# Patient Record
Sex: FEMALE | Race: WHITE | NOT HISPANIC OR LATINO | Employment: UNEMPLOYED | URBAN - METROPOLITAN AREA
[De-identification: names, ages, dates, MRNs, and addresses within clinical notes are randomized per-mention and may not be internally consistent; named-entity substitution may affect disease eponyms.]

---

## 2020-01-28 ENCOUNTER — HOSPITAL ENCOUNTER (OUTPATIENT)
Dept: FAMILY MEDICINE CLINIC | Facility: CLINIC | Age: 60
Discharge: HOME | End: 2020-01-28
Attending: NURSE PRACTITIONER

## 2020-01-28 ENCOUNTER — OFFICE VISIT CONVERTED (OUTPATIENT)
Dept: FAMILY MEDICINE CLINIC | Facility: CLINIC | Age: 60
End: 2020-01-28
Attending: NURSE PRACTITIONER

## 2020-01-28 ENCOUNTER — CONVERSION ENCOUNTER (OUTPATIENT)
Dept: FAMILY MEDICINE CLINIC | Facility: CLINIC | Age: 60
End: 2020-01-28

## 2021-05-09 VITALS
SYSTOLIC BLOOD PRESSURE: 170 MMHG | HEIGHT: 65 IN | BODY MASS INDEX: 29.34 KG/M2 | TEMPERATURE: 98.2 F | WEIGHT: 176.12 LBS | HEART RATE: 82 BPM | DIASTOLIC BLOOD PRESSURE: 96 MMHG | OXYGEN SATURATION: 97 %

## 2022-05-03 ENCOUNTER — OFFICE VISIT (OUTPATIENT)
Dept: FAMILY MEDICINE CLINIC | Facility: CLINIC | Age: 62
End: 2022-05-03

## 2022-05-03 VITALS
OXYGEN SATURATION: 98 % | TEMPERATURE: 98.2 F | WEIGHT: 166 LBS | SYSTOLIC BLOOD PRESSURE: 162 MMHG | HEIGHT: 65 IN | DIASTOLIC BLOOD PRESSURE: 98 MMHG | BODY MASS INDEX: 27.66 KG/M2 | HEART RATE: 85 BPM

## 2022-05-03 DIAGNOSIS — Z12.11 COLON CANCER SCREENING: ICD-10-CM

## 2022-05-03 DIAGNOSIS — M25.562 LEFT KNEE PAIN, UNSPECIFIED CHRONICITY: Primary | ICD-10-CM

## 2022-05-03 PROCEDURE — 99213 OFFICE O/P EST LOW 20 MIN: CPT | Performed by: NURSE PRACTITIONER

## 2022-05-03 RX ORDER — DICLOFENAC SODIUM 75 MG/1
75 TABLET, DELAYED RELEASE ORAL 2 TIMES DAILY
Qty: 60 TABLET | Refills: 1 | Status: SHIPPED | OUTPATIENT
Start: 2022-05-03 | End: 2022-08-31 | Stop reason: SDUPTHER

## 2022-05-03 NOTE — PROGRESS NOTES
Chief Complaint  Knee Pain (Left knee pain and swelling, started sometime in February)    PHQ-2 Total Score: 0    Subjective        Past Medical History:   Diagnosis Date   • Osteopenia      Social History     Tobacco Use   • Smoking status: Current Every Day Smoker     Packs/day: 1.00     Start date: 1973   • Smokeless tobacco: Never Used   Vaping Use   • Vaping Use: Never used   Substance Use Topics   • Alcohol use: Yes   • Drug use: Yes     Types: Marijuana      Current Outpatient Medications on File Prior to Visit   Medication Sig   • B Complex Vitamins (VITAMIN B COMPLEX PO) Take  by mouth.   • Calcium-Magnesium-Vitamin D 185- MG-MG-UNIT capsule Take  by mouth.   • Misc Natural Products (OSTEO BI-FLEX ADV JOINT SHIELD PO) Take  by mouth.   • Potassium (POTASSIMIN PO) Take  by mouth.     No current facility-administered medications on file prior to visit.      Allergies   Allergen Reactions   • Hydrocodone Headache   • Hydrocodone-Acetaminophen Headache      Health Maintenance Due   Topic Date Due   • DXA SCAN  Never done   • ANNUAL PHYSICAL  Never done   • Pneumococcal Vaccine 0-64 (1 - PCV) Never done   • TDAP/TD VACCINES (1 - Tdap) Never done   • ZOSTER VACCINE (1 of 2) Never done   • COVID-19 Vaccine (3 - Booster for Moderna series) 10/11/2021   • HEPATITIS C SCREENING  Never done   • PAP SMEAR  Never done      Maine Taylor presents to [facility]  Here with left knee pain and swelling x 2-3 months. Pt states the pain worsens with weather changes. No known injury or change in activity. Pt states she woke up one morning and the knee felt like something had been trying to push the knee sideways. She has taken tylenol, ibuprofen or aleve with mild relief. Pain is worse with going from sitting to standing and going down the steps.     Elevated BP in office today. Monitors BP at home and states it is usually number.     Had a mammogram in December 2021. Pap smear is up  "to date.     Knee Pain         Objective   Vital Signs:   /98 (BP Location: Left arm)   Pulse 85   Temp 98.2 °F (36.8 °C)   Ht 165.1 cm (65\")   Wt 75.3 kg (166 lb)   SpO2 98%   BMI 27.62 kg/m²     Review of Systems   Physical Exam  Vitals reviewed.   Constitutional:       General: She is not in acute distress.     Appearance: Normal appearance. She is well-developed.   Eyes:      Conjunctiva/sclera: Conjunctivae normal.      Pupils: Pupils are equal, round, and reactive to light.   Cardiovascular:      Rate and Rhythm: Normal rate and regular rhythm.      Heart sounds: Normal heart sounds.   Pulmonary:      Effort: Pulmonary effort is normal.      Breath sounds: Normal breath sounds.   Musculoskeletal:      Left knee: Swelling present. No deformity or bony tenderness. No tenderness.      Right lower leg: Edema present.      Left lower leg: Edema present.   Skin:     General: Skin is warm and dry.   Neurological:      Mental Status: She is alert and oriented to person, place, and time.   Psychiatric:         Mood and Affect: Mood and affect normal.         Behavior: Behavior normal.         Thought Content: Thought content normal.         Judgment: Judgment normal.        Result Review :   The following data was reviewed by: LONNIE Huber on 05/03/2022:    Data reviewed: Radiologic studies Left knee x-ray          Assessment and Plan    Diagnoses and all orders for this visit:    1. Left knee pain, unspecified chronicity (Primary)  -     XR Knee 1 or 2 View Left (In Office)  -     diclofenac (VOLTAREN) 75 MG EC tablet; Take 1 tablet by mouth 2 (Two) Times a Day.  Dispense: 60 tablet; Refill: 1    2. Colon cancer screening  -     Cologuard - Stool, Per Rectum; Future       Notify in 1 week with no improvement and will order MRI.    Follow Up   Return in about 1 week (around 5/10/2022), or if symptoms worsen or fail to improve.  Patient was given instructions and counseling regarding her condition " or for health maintenance advice. Please see specific information pulled into the AVS if appropriate.

## 2022-05-25 DIAGNOSIS — R19.5 POSITIVE COLORECTAL CANCER SCREENING USING DNA-BASED STOOL TEST: Primary | ICD-10-CM

## 2022-08-22 ENCOUNTER — TELEPHONE (OUTPATIENT)
Dept: GASTROENTEROLOGY | Facility: CLINIC | Age: 62
End: 2022-08-22

## 2022-08-22 NOTE — TELEPHONE ENCOUNTER
Attempted to contact patient to do screening call and schedule colonoscopy. Left voicemail for patient to reschedule

## 2022-08-31 ENCOUNTER — OFFICE VISIT (OUTPATIENT)
Dept: FAMILY MEDICINE CLINIC | Facility: CLINIC | Age: 62
End: 2022-08-31

## 2022-08-31 VITALS
DIASTOLIC BLOOD PRESSURE: 108 MMHG | WEIGHT: 182 LBS | SYSTOLIC BLOOD PRESSURE: 164 MMHG | HEART RATE: 101 BPM | BODY MASS INDEX: 30.32 KG/M2 | TEMPERATURE: 96.9 F | OXYGEN SATURATION: 98 % | HEIGHT: 65 IN

## 2022-08-31 DIAGNOSIS — M25.562 LEFT KNEE PAIN, UNSPECIFIED CHRONICITY: ICD-10-CM

## 2022-08-31 DIAGNOSIS — M54.50 LOW BACK PAIN WITH RADIATION: Primary | ICD-10-CM

## 2022-08-31 PROBLEM — G43.909 MIGRAINE: Status: ACTIVE | Noted: 2022-08-31

## 2022-08-31 PROBLEM — D64.9 ANEMIA: Status: ACTIVE | Noted: 2022-08-31

## 2022-08-31 PROCEDURE — 99213 OFFICE O/P EST LOW 20 MIN: CPT | Performed by: NURSE PRACTITIONER

## 2022-08-31 NOTE — PROGRESS NOTES
Chief Complaint  Arthritis (Wants to discuss the diclofenac, wants to change or increase) and Back Pain    Subjective        Past Medical History:   Diagnosis Date   • Osteopenia      Social History     Tobacco Use   • Smoking status: Current Every Day Smoker     Packs/day: 1.00     Start date: 1973   • Smokeless tobacco: Never Used   Vaping Use   • Vaping Use: Never used   Substance Use Topics   • Alcohol use: Yes   • Drug use: Yes     Types: Marijuana      Current Outpatient Medications on File Prior to Visit   Medication Sig   • B Complex Vitamins (VITAMIN B COMPLEX PO) Take  by mouth.   • Calcium-Magnesium-Vitamin D 185- MG-MG-UNIT capsule Take  by mouth.   • Misc Natural Products (OSTEO BI-FLEX ADV JOINT SHIELD PO) Take  by mouth.   • Potassium (POTASSIMIN PO) Take  by mouth.   • [DISCONTINUED] diclofenac (VOLTAREN) 75 MG EC tablet Take 1 tablet by mouth 2 (Two) Times a Day.     No current facility-administered medications on file prior to visit.      Allergies   Allergen Reactions   • Hydrocodone Headache   • Hydrocodone-Acetaminophen Headache      Health Maintenance Due   Topic Date Due   • MAMMOGRAM  Never done   • DXA SCAN  Never done   • ANNUAL PHYSICAL  Never done   • Pneumococcal Vaccine 0-64 (1 - PCV) Never done   • TDAP/TD VACCINES (1 - Tdap) Never done   • ZOSTER VACCINE (1 of 2) Never done   • COVID-19 Vaccine (3 - Booster for Moderna series) 10/11/2021   • HEPATITIS C SCREENING  Never done   • PAP SMEAR  Never done      Maine Taylor presents to [facility]  Here to follow up on arthritis. She is currently taking diclofenac 75mg twice daily as needed for back pain and will improve pain in about 2 days but when her knee bothers her she will have to take for 5-6 days. Also notes low back pain that angles down into the hip and out through the left groin and all the way down to the ankle. Using a tiger balm patch will also help and aspercreme with lidocaine.  "Pt does not want to take diclofenac daily.       Objective   Vital Signs:   BP (!) 164/108 (BP Location: Right arm)   Pulse 101   Temp 96.9 °F (36.1 °C)   Ht 165.1 cm (65\")   Wt 82.6 kg (182 lb)   SpO2 98%   BMI 30.29 kg/m²     Review of Systems   Physical Exam  Vitals reviewed.   Constitutional:       General: She is not in acute distress.     Appearance: Normal appearance. She is well-developed.   HENT:      Head: Normocephalic and atraumatic.      Right Ear: External ear normal.      Left Ear: External ear normal.   Eyes:      Conjunctiva/sclera: Conjunctivae normal.      Pupils: Pupils are equal, round, and reactive to light.   Cardiovascular:      Rate and Rhythm: Normal rate and regular rhythm.      Heart sounds: Normal heart sounds.   Pulmonary:      Effort: Pulmonary effort is normal.      Breath sounds: Normal breath sounds.   Musculoskeletal:      Cervical back: Neck supple.      Left knee: Swelling and crepitus present.      Right lower leg: Edema present.      Left lower leg: Edema present.   Skin:     General: Skin is warm and dry.   Neurological:      Mental Status: She is alert and oriented to person, place, and time.      Cranial Nerves: No cranial nerve deficit.   Psychiatric:         Mood and Affect: Mood and affect normal.         Behavior: Behavior normal.         Thought Content: Thought content normal.         Judgment: Judgment normal.        Result Review :   The following data was reviewed by: LONNIE Huber on 08/31/2022:    Data reviewed: Radiologic studies Left knee x-ray          Assessment and Plan    Diagnoses and all orders for this visit:    1. Low back pain with radiation (Primary)  -     Ambulatory Referral to Physical Therapy Evaluate and treat; Stretching, ROM, Strengthening    2. Left knee pain, unspecified chronicity  -     diclofenac (VOLTAREN) 50 MG EC tablet; Take 1 tablet by mouth 3 (Three) Times a Day.  Dispense: 90 tablet; Refill: 2  -     Ambulatory " Referral to Physical Therapy Evaluate and treat; Stretching, ROM, Strengthening           Notify with no improvement in the knee and will refer to Ortho.     Follow Up   Return in about 4 weeks (around 9/28/2022) for Recheck.  Patient was given instructions and counseling regarding her condition or for health maintenance advice. Please see specific information pulled into the AVS if appropriate.

## 2022-09-14 ENCOUNTER — PREP FOR SURGERY (OUTPATIENT)
Dept: OTHER | Facility: HOSPITAL | Age: 62
End: 2022-09-14

## 2022-09-14 ENCOUNTER — CLINICAL SUPPORT (OUTPATIENT)
Dept: GASTROENTEROLOGY | Facility: CLINIC | Age: 62
End: 2022-09-14

## 2022-09-14 DIAGNOSIS — Z12.11 COLON CANCER SCREENING: Primary | ICD-10-CM

## 2022-09-14 RX ORDER — SODIUM, POTASSIUM,MAG SULFATES 17.5-3.13G
1 SOLUTION, RECONSTITUTED, ORAL ORAL EVERY 12 HOURS
Qty: 354 ML | Refills: 0 | Status: SHIPPED | OUTPATIENT
Start: 2022-09-14 | End: 2022-09-15 | Stop reason: SDUPTHER

## 2022-09-14 NOTE — PROGRESS NOTES
Maine Taylor  REASON FOR CALL Screening for colonoscopy   SENT IN [facility]   Past Medical History:   Diagnosis Date   • Osteopenia      Allergies   Allergen Reactions   • Hydrocodone Headache   • Hydrocodone-Acetaminophen Headache     Past Surgical History:   Procedure Laterality Date   • POLYPECTOMY       Social History     Socioeconomic History   • Marital status:    Tobacco Use   • Smoking status: Current Every Day Smoker     Packs/day: 1.00     Start date: 1973   • Smokeless tobacco: Never Used   Vaping Use   • Vaping Use: Never used   Substance and Sexual Activity   • Alcohol use: Yes   • Drug use: Yes     Types: Marijuana   • Sexual activity: Defer     Family History   Problem Relation Age of Onset   • Dementia Mother    • Hypertension Father    • Throat cancer Father        Current Outpatient Medications:   •  B Complex Vitamins (VITAMIN B COMPLEX PO), Take  by mouth., Disp: , Rfl:   •  Calcium-Magnesium-Vitamin D 185- MG-MG-UNIT capsule, Take  by mouth., Disp: , Rfl:   •  diclofenac (VOLTAREN) 50 MG EC tablet, Take 1 tablet by mouth 3 (Three) Times a Day., Disp: 90 tablet, Rfl: 2  •  Misc Natural Products (OSTEO BI-FLEX ADV JOINT SHIELD PO), Take  by mouth., Disp: , Rfl:   •  Potassium (POTASSIMIN PO), Take  by mouth., Disp: , Rfl:

## 2022-09-15 ENCOUNTER — TELEPHONE (OUTPATIENT)
Dept: GASTROENTEROLOGY | Facility: CLINIC | Age: 62
End: 2022-09-15

## 2023-01-19 ENCOUNTER — TELEPHONE (OUTPATIENT)
Dept: GASTROENTEROLOGY | Facility: CLINIC | Age: 63
End: 2023-01-19
Payer: COMMERCIAL

## 2023-02-03 ENCOUNTER — ANESTHESIA EVENT (OUTPATIENT)
Dept: GASTROENTEROLOGY | Facility: HOSPITAL | Age: 63
End: 2023-02-03
Payer: COMMERCIAL

## 2023-02-03 ENCOUNTER — ANESTHESIA (OUTPATIENT)
Dept: GASTROENTEROLOGY | Facility: HOSPITAL | Age: 63
End: 2023-02-03
Payer: COMMERCIAL

## 2023-02-03 ENCOUNTER — HOSPITAL ENCOUNTER (OUTPATIENT)
Facility: HOSPITAL | Age: 63
Setting detail: HOSPITAL OUTPATIENT SURGERY
Discharge: HOME | End: 2023-02-03
Attending: INTERNAL MEDICINE | Admitting: INTERNAL MEDICINE
Payer: COMMERCIAL

## 2023-02-03 VITALS
DIASTOLIC BLOOD PRESSURE: 114 MMHG | HEART RATE: 69 BPM | BODY MASS INDEX: 30.63 KG/M2 | WEIGHT: 183.86 LBS | OXYGEN SATURATION: 97 % | TEMPERATURE: 98.1 F | RESPIRATION RATE: 17 BRPM | HEIGHT: 65 IN | SYSTOLIC BLOOD PRESSURE: 156 MMHG

## 2023-02-03 DIAGNOSIS — Z12.11 COLON CANCER SCREENING: ICD-10-CM

## 2023-02-03 PROCEDURE — 45390 COLONOSCOPY W/RESECTION: CPT | Performed by: INTERNAL MEDICINE

## 2023-02-03 PROCEDURE — 25010000002 PROPOFOL 10 MG/ML EMULSION: Performed by: NURSE ANESTHETIST, CERTIFIED REGISTERED

## 2023-02-03 PROCEDURE — 88305 TISSUE EXAM BY PATHOLOGIST: CPT | Performed by: INTERNAL MEDICINE

## 2023-02-03 DEVICE — DEV CLIP HEMO RESOLUTION360/ULTR 235CM 17MM STRL: Type: IMPLANTABLE DEVICE | Site: CECUM | Status: FUNCTIONAL

## 2023-02-03 RX ORDER — LIDOCAINE HYDROCHLORIDE 20 MG/ML
INJECTION, SOLUTION EPIDURAL; INFILTRATION; INTRACAUDAL; PERINEURAL AS NEEDED
Status: DISCONTINUED | OUTPATIENT
Start: 2023-02-03 | End: 2023-02-03 | Stop reason: SURG

## 2023-02-03 RX ORDER — PROPOFOL 10 MG/ML
VIAL (ML) INTRAVENOUS AS NEEDED
Status: DISCONTINUED | OUTPATIENT
Start: 2023-02-03 | End: 2023-02-03 | Stop reason: SURG

## 2023-02-03 RX ORDER — SODIUM CHLORIDE, SODIUM LACTATE, POTASSIUM CHLORIDE, CALCIUM CHLORIDE 600; 310; 30; 20 MG/100ML; MG/100ML; MG/100ML; MG/100ML
30 INJECTION, SOLUTION INTRAVENOUS CONTINUOUS
Status: DISCONTINUED | OUTPATIENT
Start: 2023-02-03 | End: 2023-02-03 | Stop reason: HOSPADM

## 2023-02-03 RX ADMIN — PROPOFOL 50 MG: 10 INJECTION, EMULSION INTRAVENOUS at 09:33

## 2023-02-03 RX ADMIN — SODIUM CHLORIDE, POTASSIUM CHLORIDE, SODIUM LACTATE AND CALCIUM CHLORIDE: 600; 310; 30; 20 INJECTION, SOLUTION INTRAVENOUS at 09:33

## 2023-02-03 RX ADMIN — PROPOFOL 250 MCG/KG/MIN: 10 INJECTION, EMULSION INTRAVENOUS at 09:33

## 2023-02-03 RX ADMIN — LIDOCAINE HYDROCHLORIDE 40 MG: 20 INJECTION, SOLUTION EPIDURAL; INFILTRATION; INTRACAUDAL; PERINEURAL at 09:33

## 2023-02-03 NOTE — ANESTHESIA POSTPROCEDURE EVALUATION
Patient: Maine Taylor    Procedure Summary     Date: 02/03/23 Room / Location: [facility] ENDOSCOPY 1 / [facility] ENDOSCOPY    Anesthesia Start: 0932 Anesthesia Stop: 1022    Procedure: COLONOSCOPY WITH ORISE INJECTION, PIECEMEAL POLYPECTOMY, CATUTERY, CLIP APPLICATION X5 Diagnosis:       Colon cancer screening      (Colon cancer screening [Z12.11])    Surgeons: Chanell Sims MD Provider: Chucho Omer MD    Anesthesia Type: general ASA Status: 3          Anesthesia Type: general    Vitals  Vitals Value Taken Time   /114 02/03/23 1048   Temp 36.7 °C (98.1 °F) 02/03/23 1048   Pulse 69 02/03/23 1048   Resp 17 02/03/23 1048   SpO2 97 % 02/03/23 1048           Post Anesthesia Care and Evaluation    Patient location during evaluation: bedside  Patient participation: complete - patient participated  Level of consciousness: awake  Pain management: adequate    Airway patency: patent  Anesthetic complications: No anesthetic complications  PONV Status: none  Cardiovascular status: acceptable and stable  Respiratory status: acceptable  Hydration status: acceptable    Comments: An Anesthesiologist personally participated in the most demanding procedures (including induction and emergence if applicable) in the anesthesia plan, monitored the course of anesthesia administration at frequent intervals and remained physically present and available for immediate diagnosis and treatment of emergencies.

## 2023-02-03 NOTE — ANESTHESIA PREPROCEDURE EVALUATION
Anesthesia Evaluation     Patient summary reviewed and Nursing notes reviewed   no history of anesthetic complications:  NPO Solid Status: > 8 hours  NPO Liquid Status: > 2 hours           Airway   Mallampati: III  TM distance: >3 FB  Neck ROM: full  Possible difficult intubation and Small opening  Dental    (+) poor dentition    Pulmonary - normal exam    breath sounds clear to auscultation  (+) a smoker Current, sleep apnea,   Cardiovascular - negative cardio ROS and normal exam  Exercise tolerance: good (4-7 METS)    Rhythm: regular  Rate: normal        Neuro/Psych- negative ROS  GI/Hepatic/Renal/Endo - negative ROS     Musculoskeletal (-) negative ROS    Abdominal    Substance History - negative use     OB/GYN negative ob/gyn ROS         Other - negative ROS       ROS/Med Hx Other: PAT Nursing Notes unavailable.                   Anesthesia Plan    ASA 3     general       Anesthetic plan, risks, benefits, and alternatives have been provided, discussed and informed consent has been obtained with: patient and spouse/significant other.        CODE STATUS:

## 2023-02-03 NOTE — H&P
"Pre Procedure History & Physical    Chief Complaint:   Positive Cologuard    Subjective     HPI:   61 yo F here for positive Cologuard.    Past Medical History:   Past Medical History:   Diagnosis Date   • Arthritis    • Osteopenia    • Sleep apnea        Past Surgical History:  Past Surgical History:   Procedure Laterality Date   • POLYPECTOMY N/A     cervical   • TUBAL ABDOMINAL LIGATION N/A        Family History:  Family History   Problem Relation Age of Onset   • Dementia Mother    • Hypertension Father    • Throat cancer Father    • Malig Hyperthermia Neg Hx        Social History:   reports that she has been smoking cigarettes. She started smoking about 50 years ago. She has been smoking an average of 1 pack per day. She has never used smokeless tobacco. She reports current alcohol use. She reports current drug use. Drug: Marijuana.    Medications:   Medications Prior to Admission   Medication Sig Dispense Refill Last Dose   • B Complex Vitamins (VITAMIN B COMPLEX PO) Take 1 tablet by mouth Daily.      • Calcium-Magnesium-Vitamin D 185- MG-MG-UNIT capsule Take 3 tablets by mouth Daily.      • diclofenac (VOLTAREN) 50 MG EC tablet Take 1 tablet by mouth 3 (Three) Times a Day. (Patient taking differently: Take 50 mg by mouth As Needed.) 90 tablet 2    • Misc Natural Products (OSTEO BI-FLEX ADV JOINT SHIELD PO) Take 1 tablet by mouth Daily.      • polyethylene glycol (GoLYTELY) 236 g solution Starting at noon on day prior to procedure, drink 8 ounces every 30 minutes until all gone or stools are clear. May add flavor packet. (Patient taking differently: Take  by mouth Every 12 (Twelve) Hours.) 4000 mL 0    • Potassium (POTASSIMIN PO) Take 1 tablet by mouth Daily.          Allergies:  Hydrocodone and Hydrocodone-acetaminophen    ROS:    Pertinent items are noted in HPI     Objective     Height 165.1 cm (65\"), weight 83.4 kg (183 lb 13.8 oz).    Physical Exam   Constitutional: Pt is oriented to person, place, " and time and well-developed, well-nourished, and in no distress.   Mouth/Throat: Oropharynx is clear and moist.   Neck: Normal range of motion.   Cardiovascular: Normal rate, regular rhythm and normal heart sounds.    Pulmonary/Chest: Effort normal and breath sounds normal.   Abdominal: Soft. Nontender  Skin: Skin is warm and dry.   Psychiatric: Mood, memory, affect and judgment normal.     Assessment & Plan     Diagnosis:  Positive Cologuard    Anticipated Surgical Procedure:  Colonoscopy    The risks, benefits, and alternatives of this procedure have been discussed with the patient or the responsible party- the patient understands and agrees to proceed.

## 2023-02-06 ENCOUNTER — TELEPHONE (OUTPATIENT)
Dept: GASTROENTEROLOGY | Facility: CLINIC | Age: 63
End: 2023-02-06
Payer: COMMERCIAL

## 2023-02-06 LAB
CYTO UR: NORMAL
LAB AP CASE REPORT: NORMAL
LAB AP CLINICAL INFORMATION: NORMAL
PATH REPORT.FINAL DX SPEC: NORMAL
PATH REPORT.GROSS SPEC: NORMAL

## 2023-02-06 NOTE — TELEPHONE ENCOUNTER
Patient notified of results, she would like to go ahead and scheduled repeat colonoscopy because it had taken her quite a while to get on for the original colonoscopy, I told her I would ask you.

## 2023-02-06 NOTE — TELEPHONE ENCOUNTER
----- Message from LONNIE Phillips sent at 2/6/2023 12:24 PM EST -----  Please let patient know that colon polyp biopsies are benign.  Recommend repeat colonoscopy in 6 months to follow-up on piecemeal polyp removal.  Please place in recall.

## 2023-02-07 ENCOUNTER — PREP FOR SURGERY (OUTPATIENT)
Dept: OTHER | Facility: HOSPITAL | Age: 63
End: 2023-02-07
Payer: COMMERCIAL

## 2023-02-07 DIAGNOSIS — Z86.010 HISTORY OF COLON POLYPS: Primary | ICD-10-CM

## 2023-02-07 PROBLEM — Z86.0100 HISTORY OF COLON POLYPS: Status: ACTIVE | Noted: 2023-02-07

## 2023-04-14 ENCOUNTER — PREP FOR SURGERY (OUTPATIENT)
Dept: OTHER | Facility: HOSPITAL | Age: 63
End: 2023-04-14
Payer: COMMERCIAL

## 2023-07-31 ENCOUNTER — TELEPHONE (OUTPATIENT)
Dept: GASTROENTEROLOGY | Facility: CLINIC | Age: 63
End: 2023-07-31
Payer: COMMERCIAL

## 2023-08-07 NOTE — PRE-PROCEDURE INSTRUCTIONS
"Instructed on date and arrival time of 0600. Come to entrance \"C\". Must have  over age 18 to drive home.  May have two visitors; however, children under 12 must stay in waiting room.  Discussed clear liquid diet (no red or purple) and bowel prep.  May take medications as usual except for blood thinners, diabetic medications, and weight loss medications.  Bring list of medications.  Verbalized understanding of instructions given.  Phone number given for questions or concerns.  "

## 2023-08-14 ENCOUNTER — HOSPITAL ENCOUNTER (OUTPATIENT)
Facility: HOSPITAL | Age: 63
Setting detail: HOSPITAL OUTPATIENT SURGERY
Discharge: HOME | End: 2023-08-14
Attending: INTERNAL MEDICINE | Admitting: INTERNAL MEDICINE
Payer: COMMERCIAL

## 2023-08-14 ENCOUNTER — ANESTHESIA EVENT (OUTPATIENT)
Dept: GASTROENTEROLOGY | Facility: HOSPITAL | Age: 63
End: 2023-08-14
Payer: COMMERCIAL

## 2023-08-14 ENCOUNTER — ANESTHESIA (OUTPATIENT)
Dept: GASTROENTEROLOGY | Facility: HOSPITAL | Age: 63
End: 2023-08-14
Payer: COMMERCIAL

## 2023-08-14 VITALS
DIASTOLIC BLOOD PRESSURE: 91 MMHG | OXYGEN SATURATION: 97 % | WEIGHT: 189.15 LBS | HEART RATE: 70 BPM | TEMPERATURE: 97.8 F | RESPIRATION RATE: 17 BRPM | SYSTOLIC BLOOD PRESSURE: 161 MMHG | BODY MASS INDEX: 31.48 KG/M2

## 2023-08-14 DIAGNOSIS — Z86.010 HISTORY OF COLON POLYPS: ICD-10-CM

## 2023-08-14 PROCEDURE — 45385 COLONOSCOPY W/LESION REMOVAL: CPT | Performed by: INTERNAL MEDICINE

## 2023-08-14 PROCEDURE — 45380 COLONOSCOPY AND BIOPSY: CPT | Performed by: INTERNAL MEDICINE

## 2023-08-14 PROCEDURE — 88305 TISSUE EXAM BY PATHOLOGIST: CPT | Performed by: INTERNAL MEDICINE

## 2023-08-14 PROCEDURE — 25010000002 PROPOFOL 10 MG/ML EMULSION: Performed by: NURSE ANESTHETIST, CERTIFIED REGISTERED

## 2023-08-14 RX ORDER — LIDOCAINE HYDROCHLORIDE 20 MG/ML
INJECTION, SOLUTION EPIDURAL; INFILTRATION; INTRACAUDAL; PERINEURAL AS NEEDED
Status: DISCONTINUED | OUTPATIENT
Start: 2023-08-14 | End: 2023-08-14 | Stop reason: SURG

## 2023-08-14 RX ORDER — PROPOFOL 10 MG/ML
VIAL (ML) INTRAVENOUS AS NEEDED
Status: DISCONTINUED | OUTPATIENT
Start: 2023-08-14 | End: 2023-08-14 | Stop reason: SURG

## 2023-08-14 RX ORDER — SODIUM CHLORIDE, SODIUM LACTATE, POTASSIUM CHLORIDE, CALCIUM CHLORIDE 600; 310; 30; 20 MG/100ML; MG/100ML; MG/100ML; MG/100ML
30 INJECTION, SOLUTION INTRAVENOUS CONTINUOUS
Status: DISCONTINUED | OUTPATIENT
Start: 2023-08-14 | End: 2023-08-14 | Stop reason: HOSPADM

## 2023-08-14 RX ORDER — SODIUM CHLORIDE, SODIUM LACTATE, POTASSIUM CHLORIDE, CALCIUM CHLORIDE 600; 310; 30; 20 MG/100ML; MG/100ML; MG/100ML; MG/100ML
1000 INJECTION, SOLUTION INTRAVENOUS CONTINUOUS
Status: DISCONTINUED | OUTPATIENT
Start: 2023-08-14 | End: 2023-08-14 | Stop reason: HOSPADM

## 2023-08-14 RX ADMIN — SODIUM CHLORIDE, POTASSIUM CHLORIDE, SODIUM LACTATE AND CALCIUM CHLORIDE 1000 ML: 600; 310; 30; 20 INJECTION, SOLUTION INTRAVENOUS at 06:35

## 2023-08-14 RX ADMIN — PROPOFOL 50 MG: 10 INJECTION, EMULSION INTRAVENOUS at 07:29

## 2023-08-14 RX ADMIN — LIDOCAINE HYDROCHLORIDE 40 MG: 20 INJECTION, SOLUTION EPIDURAL; INFILTRATION; INTRACAUDAL; PERINEURAL at 07:29

## 2023-08-14 RX ADMIN — PROPOFOL 250 MCG/KG/MIN: 10 INJECTION, EMULSION INTRAVENOUS at 07:29

## 2023-08-14 NOTE — ANESTHESIA POSTPROCEDURE EVALUATION
Patient: Maine Taylor    Procedure Summary       Date: 08/14/23 Room / Location: [facility] ENDOSCOPY 1 / [facility] END[facility]    Anesthesia Start: 0727 Anesthesia Stop: 0805    Procedure: COLONOSCOPY WITH COLD SNARE POLYPECTOMIES Diagnosis:       History of colon polyps      (History of colon polyps [Z86.010])    Surgeons: Chanell Sims MD Provider: Chucho Omer MD    Anesthesia Type: general ASA Status: 3            Anesthesia Type: general    Vitals  Vitals Value Taken Time   /91 08/14/23 0817   Temp 36.6 øC (97.8 øF) 08/14/23 0817   Pulse 74 08/14/23 0818   Resp 17 08/14/23 0817   SpO2 97 % 08/14/23 0818   Vitals shown include unvalidated device data.        Post Anesthesia Care and Evaluation    Patient location during evaluation: bedside  Patient participation: complete - patient participated  Level of consciousness: awake  Pain management: adequate    Airway patency: patent  PONV Status: none  Cardiovascular status: acceptable and stable  Respiratory status: acceptable  Hydration status: acceptable    Comments: An Anesthesiologist personally participated in the most demanding procedures (including induction and emergence if applicable) in the anesthesia plan, monitored the course of anesthesia administration at frequent intervals and remained physically present and available for immediate diagnosis and treatment of emergencies.

## 2023-08-14 NOTE — H&P
Pre Procedure History & Physical    Chief Complaint:   Surveillance colonoscopy     Subjective     HPI:   61 yo F here for surveillance colonoscopy.    Past Medical History:   Past Medical History:   Diagnosis Date    Arthritis     Osteopenia     Sleep apnea        Past Surgical History:  Past Surgical History:   Procedure Laterality Date    COLONOSCOPY N/A 2/3/2023    Procedure: COLONOSCOPY WITH ORISE INJECTION, PIECEMEAL POLYPECTOMY, CATUTERY, CLIP APPLICATION X5;  Surgeon: Chanell Sims MD;  Location: [facility] ENDOSCOPY;  Service: Gastroenterology;  Laterality: N/A;  COLON POLYP, DIVERTICULOSIS, HEMORRHOIDS    POLYPECTOMY N/A     cervical    TUBAL ABDOMINAL LIGATION N/A        Family History:  Family History   Problem Relation Age of Onset    Dementia Mother     Hypertension Father     Throat cancer Father     Malig Hyperthermia Neg Hx        Social History:   reports that she has been smoking cigarettes. She started smoking about 50 years ago. She has been smoking an average of 1.5 packs per day. She has never used smokeless tobacco. She reports current alcohol use. She reports current drug use. Drug: Marijuana.    Medications:   Medications Prior to Admission   Medication Sig Dispense Refill Last Dose    B Complex Vitamins (VITAMIN B COMPLEX PO) Take 1 tablet by mouth Daily.   Past Week    Calcium-Magnesium-Vitamin D 185- MG-MG-UNIT capsule Take 3 tablets by mouth Daily.   Past Week    diclofenac (VOLTAREN) 50 MG EC tablet Take 1 tablet by mouth 3 (Three) Times a Day. (Patient taking differently: Take 1 tablet by mouth As Needed.) 90 tablet 2 Past Month    Misc Natural Products (OSTEO BI-FLEX ADV JOINT SHIELD PO) Take 1 tablet by mouth Daily.   Past Week    Potassium (POTASSIMIN PO) Take 1 tablet by mouth Daily.   Past Week       Allergies:  Hydrocodone and Hydrocodone-acetaminophen    ROS:    Pertinent items are noted in HPI, all other systems reviewed and negative     Objective     Blood  pressure 134/89, pulse 87, temperature 97.6 øF (36.4 øC), temperature source Temporal, resp. rate 16, weight 85.8 kg (189 lb 2.5 oz), SpO2 95 %.    Physical Exam   Constitutional: Pt is oriented to person, place, and time and well-developed, well-nourished, and in no distress.   Mouth/Throat: Oropharynx is clear and moist.   Neck: Normal range of motion.   Cardiovascular: Normal rate, regular rhythm and normal heart sounds.    Pulmonary/Chest: Effort normal and breath sounds normal.   Abdominal: Soft. Nontender  Skin: Skin is warm and dry.   Psychiatric: Mood, memory, affect and judgment normal.     Assessment & Plan     Diagnosis:  Surveillance colonoscopy    Anticipated Surgical Procedure:  Colonoscopy    The risks, benefits, and alternatives of this procedure have been discussed with the patient or the responsible party- the patient understands and agrees to proceed.

## 2023-08-21 ENCOUNTER — TELEPHONE (OUTPATIENT)
Dept: GASTROENTEROLOGY | Facility: CLINIC | Age: 63
End: 2023-08-21
Payer: COMMERCIAL

## 2023-08-21 NOTE — TELEPHONE ENCOUNTER
Spoke to patient and informed of LONNIE Becker result note and recommendations. Patient verified understanding.     Patient does not report any GI issues or concerns at this time. Educated to contact the office if any arise.    1 year colon recall placed.

## 2023-08-21 NOTE — TELEPHONE ENCOUNTER
----- Message from LONNIE Phillips sent at 8/15/2023  3:12 PM EDT -----  Colon polyps benign.  Please call patient and let her know.  Place in recall for repeat colonoscopy in 1 year.

## 2023-12-26 DIAGNOSIS — M25.562 LEFT KNEE PAIN, UNSPECIFIED CHRONICITY: ICD-10-CM

## 2023-12-26 NOTE — TELEPHONE ENCOUNTER
Caller: Maine Taylor    Relationship: Self    Best call back number: [phone]    Requested Prescriptions:   Requested Prescriptions     Pending Prescriptions Disp Refills    diclofenac (VOLTAREN) 50 MG EC tablet 90 tablet 2     Sig: Take 1 tablet by mouth 3 (Three) Times a Day.        Pharmacy where request should be sent: [id number] - [facility] [address] AT [facility] - [phone]  - [phone] FX     Last office visit with prescribing clinician: 8/31/2022   Last telemedicine visit with prescribing clinician: Visit date not found   Next office visit with prescribing clinician: Visit date not found     Additional details provided by patient:      Does the patient have less than a 3 day supply:  [x] Yes  [] No    Would you like a call back once the refill request has been completed: [] Yes [x] No    If the office needs to give you a call back, can they leave a voicemail: [] Yes [x] No    Aftab Ochoa Rep   12/26/23 09:50 EST

## 2023-12-28 ENCOUNTER — OFFICE VISIT (OUTPATIENT)
Dept: FAMILY MEDICINE CLINIC | Facility: CLINIC | Age: 63
End: 2023-12-28
Payer: COMMERCIAL

## 2023-12-28 VITALS
OXYGEN SATURATION: 92 % | DIASTOLIC BLOOD PRESSURE: 100 MMHG | HEART RATE: 104 BPM | WEIGHT: 186 LBS | SYSTOLIC BLOOD PRESSURE: 168 MMHG | BODY MASS INDEX: 30.99 KG/M2 | TEMPERATURE: 97.1 F | HEIGHT: 65 IN

## 2023-12-28 DIAGNOSIS — I10 PRIMARY HYPERTENSION: Primary | ICD-10-CM

## 2023-12-28 DIAGNOSIS — I51.7 CARDIOMEGALY: ICD-10-CM

## 2023-12-28 DIAGNOSIS — Z23 IMMUNIZATION DUE: ICD-10-CM

## 2023-12-28 DIAGNOSIS — J18.9 PNEUMONIA OF LEFT LOWER LOBE DUE TO INFECTIOUS ORGANISM: ICD-10-CM

## 2023-12-28 DIAGNOSIS — R07.81 RIB PAIN ON LEFT SIDE: ICD-10-CM

## 2023-12-28 DIAGNOSIS — M25.562 LEFT KNEE PAIN, UNSPECIFIED CHRONICITY: ICD-10-CM

## 2023-12-28 DIAGNOSIS — F17.210 CIGARETTE NICOTINE DEPENDENCE WITHOUT COMPLICATION: ICD-10-CM

## 2023-12-28 PROBLEM — R61 NIGHT SWEATS: Status: ACTIVE | Noted: 2023-12-28

## 2023-12-28 LAB
ALBUMIN SERPL-MCNC: 4.6 G/DL (ref 3.5–5.2)
ALBUMIN/GLOB SERPL: 1.3 G/DL
ALP SERPL-CCNC: 101 U/L (ref 39–117)
ALT SERPL W P-5'-P-CCNC: 26 U/L (ref 1–33)
AMORPH URATE CRY URNS QL MICRO: ABNORMAL /HPF
ANION GAP SERPL CALCULATED.3IONS-SCNC: 14.2 MMOL/L (ref 5–15)
AST SERPL-CCNC: 18 U/L (ref 1–32)
BACTERIA UR QL AUTO: ABNORMAL /HPF
BASOPHILS # BLD AUTO: 0.06 10*3/MM3 (ref 0–0.2)
BASOPHILS NFR BLD AUTO: 0.5 % (ref 0–1.5)
BILIRUB SERPL-MCNC: 0.3 MG/DL (ref 0–1.2)
BILIRUB UR QL STRIP: ABNORMAL
BUN SERPL-MCNC: 15 MG/DL (ref 8–23)
BUN/CREAT SERPL: 15.5 (ref 7–25)
CALCIUM SPEC-SCNC: 10.1 MG/DL (ref 8.6–10.5)
CHLORIDE SERPL-SCNC: 100 MMOL/L (ref 98–107)
CHOLEST SERPL-MCNC: 221 MG/DL (ref 0–200)
CLARITY UR: ABNORMAL
CO2 SERPL-SCNC: 23.8 MMOL/L (ref 22–29)
COLOR UR: ABNORMAL
CREAT SERPL-MCNC: 0.97 MG/DL (ref 0.57–1)
DEPRECATED RDW RBC AUTO: 46.6 FL (ref 37–54)
EGFRCR SERPLBLD CKD-EPI 2021: 66.6 ML/MIN/1.73
EOSINOPHIL # BLD AUTO: 0.18 10*3/MM3 (ref 0–0.4)
EOSINOPHIL NFR BLD AUTO: 1.5 % (ref 0.3–6.2)
ERYTHROCYTE [DISTWIDTH] IN BLOOD BY AUTOMATED COUNT: 14.1 % (ref 12.3–15.4)
GLOBULIN UR ELPH-MCNC: 3.6 GM/DL
GLUCOSE SERPL-MCNC: 107 MG/DL (ref 65–99)
GLUCOSE UR STRIP-MCNC: NEGATIVE MG/DL
HCT VFR BLD AUTO: 45.3 % (ref 34–46.6)
HDLC SERPL-MCNC: 55 MG/DL (ref 40–60)
HGB BLD-MCNC: 15.5 G/DL (ref 12–15.9)
HGB UR QL STRIP.AUTO: NEGATIVE
HYALINE CASTS UR QL AUTO: ABNORMAL /LPF
IMM GRANULOCYTES # BLD AUTO: 0.04 10*3/MM3 (ref 0–0.05)
IMM GRANULOCYTES NFR BLD AUTO: 0.3 % (ref 0–0.5)
KETONES UR QL STRIP: ABNORMAL
LDLC SERPL CALC-MCNC: 143 MG/DL (ref 0–100)
LDLC/HDLC SERPL: 2.55 {RATIO}
LEUKOCYTE ESTERASE UR QL STRIP.AUTO: ABNORMAL
LYMPHOCYTES # BLD AUTO: 2.5 10*3/MM3 (ref 0.7–3.1)
LYMPHOCYTES NFR BLD AUTO: 21.5 % (ref 19.6–45.3)
MCH RBC QN AUTO: 30.8 PG (ref 26.6–33)
MCHC RBC AUTO-ENTMCNC: 34.2 G/DL (ref 31.5–35.7)
MCV RBC AUTO: 89.9 FL (ref 79–97)
MONOCYTES # BLD AUTO: 0.77 10*3/MM3 (ref 0.1–0.9)
MONOCYTES NFR BLD AUTO: 6.6 % (ref 5–12)
NEUTROPHILS NFR BLD AUTO: 69.6 % (ref 42.7–76)
NEUTROPHILS NFR BLD AUTO: 8.1 10*3/MM3 (ref 1.7–7)
NITRITE UR QL STRIP: NEGATIVE
NRBC BLD AUTO-RTO: 0 /100 WBC (ref 0–0.2)
PH UR STRIP.AUTO: 5.5 [PH] (ref 5–8)
PLATELET # BLD AUTO: 265 10*3/MM3 (ref 140–450)
PMV BLD AUTO: 12 FL (ref 6–12)
POTASSIUM SERPL-SCNC: 4.5 MMOL/L (ref 3.5–5.2)
PROT SERPL-MCNC: 8.2 G/DL (ref 6–8.5)
PROT UR QL STRIP: ABNORMAL
RBC # BLD AUTO: 5.04 10*6/MM3 (ref 3.77–5.28)
RBC # UR STRIP: ABNORMAL /HPF
REF LAB TEST METHOD: ABNORMAL
SODIUM SERPL-SCNC: 138 MMOL/L (ref 136–145)
SP GR UR STRIP: 1.03 (ref 1–1.03)
SQUAMOUS #/AREA URNS HPF: ABNORMAL /HPF
TRIGL SERPL-MCNC: 130 MG/DL (ref 0–150)
TSH SERPL DL<=0.05 MIU/L-ACNC: 1.2 UIU/ML (ref 0.27–4.2)
UROBILINOGEN UR QL STRIP: ABNORMAL
VLDLC SERPL-MCNC: 23 MG/DL (ref 5–40)
WBC # UR STRIP: ABNORMAL /HPF
WBC NRBC COR # BLD AUTO: 11.65 10*3/MM3 (ref 3.4–10.8)

## 2023-12-28 PROCEDURE — 81001 URINALYSIS AUTO W/SCOPE: CPT | Performed by: NURSE PRACTITIONER

## 2023-12-28 PROCEDURE — 80061 LIPID PANEL: CPT | Performed by: NURSE PRACTITIONER

## 2023-12-28 PROCEDURE — 80050 GENERAL HEALTH PANEL: CPT | Performed by: NURSE PRACTITIONER

## 2023-12-28 PROCEDURE — 83036 HEMOGLOBIN GLYCOSYLATED A1C: CPT | Performed by: NURSE PRACTITIONER

## 2023-12-28 RX ORDER — BENZONATATE 100 MG/1
100 CAPSULE ORAL 3 TIMES DAILY PRN
Qty: 30 CAPSULE | Refills: 0 | Status: SHIPPED | OUTPATIENT
Start: 2023-12-28

## 2023-12-28 RX ORDER — AMOXICILLIN AND CLAVULANATE POTASSIUM 875; 125 MG/1; MG/1
1 TABLET, FILM COATED ORAL 2 TIMES DAILY
Qty: 20 TABLET | Refills: 0 | Status: SHIPPED | OUTPATIENT
Start: 2023-12-28

## 2023-12-28 RX ORDER — LISINOPRIL 5 MG/1
5 TABLET ORAL DAILY
Qty: 30 TABLET | Refills: 2 | Status: SHIPPED | OUTPATIENT
Start: 2023-12-28

## 2023-12-28 RX ORDER — NYSTATIN 100000 [USP'U]/G
POWDER TOPICAL
COMMUNITY
Start: 2023-10-11

## 2023-12-28 NOTE — PROGRESS NOTES
Chief Complaint  Pain (Refill Diclofenac) and Rib Pain (Had a coughing fit on Tuesday and feels like she did something to ribs on left side, hurts to cough, move, and breath)    PHQ-2 Total Score: 0    Subjective        Past Medical History:   Diagnosis Date    Arthritis     Osteopenia     Sleep apnea      Social History     Tobacco Use    Smoking status: Every Day     Packs/day: 1.50     Years: 50.00     Additional pack years: 0.00     Total pack years: 75.00     Types: Cigarettes     Start date: 1973     Passive exposure: Current    Smokeless tobacco: Never   Vaping Use    Vaping Use: Never used   Substance Use Topics    Alcohol use: Yes     Comment: weekends    Drug use: Yes     Types: Marijuana      Current Outpatient Medications on File Prior to Visit   Medication Sig    B Complex Vitamins (VITAMIN B COMPLEX PO) Take 1 tablet by mouth Daily.    Calcium-Magnesium-Vitamin D 185- MG-MG-UNIT capsule Take 3 tablets by mouth Daily.    Misc Natural Products (OSTEO BI-FLEX ADV JOINT SHIELD PO) Take 1 tablet by mouth Daily.    nystatin 835295 UNIT/GM powder APPLY TOPICALLY TO THE AFFECTED AREA THREE TIMES DAILY FOR 7 DAYS    Potassium (POTASSIMIN PO) Take 1 tablet by mouth Daily.    [DISCONTINUED] diclofenac (VOLTAREN) 50 MG EC tablet Take 1 tablet by mouth 3 (Three) Times a Day. (Patient taking differently: Take 1 tablet by mouth As Needed.)     No current facility-administered medications on file prior to visit.      Allergies   Allergen Reactions    Hydrocodone Headache    Hydrocodone-Acetaminophen Headache      Health Maintenance Due   Topic Date Due    MAMMOGRAM  Never done    DXA SCAN  Never done    Pneumococcal Vaccine 0-64 (1 of 2 - PCV) Never done    ZOSTER VACCINE (1 of 2) Never done    LUNG CANCER SCREENING  Never done    HEPATITIS C SCREENING  Never done    ANNUAL PHYSICAL  Never done    PAP SMEAR  Never done    COVID-19 Vaccine (3 - 2023-24 season) 09/01/2023      Maine Taylor presents to  "[facility]  History of Present Illness  Follow-up on chronic conditions.    Left knee pain and back pain: refill diclofenac.  Patient states being able to take the medication up to 3 times a day has been beneficial.    Pt notes lisandro rudolph had a coughing fit 2 days ago and since then has left rib pain and is worried about a cracked rib. Pt notes pain with coughing, deep breathing, hiccups. Pt notes since Thanksgiving having a deep, coarse cough until about 1 week ago started to improved and sinus congestion and headache had resolved. Voice still hasn't fully returned.     Mammogram: No mammogram on file, gets one yearly at [facility] through Dr. Arredondo. She also orders her bone density.     Fasting labs: pt states she has not had fasting labs but does not see a point on doing them as she is not having any issues.  Discussed with patient based on age and high blood pressures will need to check labs.    Elevated BP in office. Home readings show BP usually in the 140's and diastolic varies from .     Objective   Vital Signs:   /100 (BP Location: Left arm)   Pulse 104   Temp 97.1 °F (36.2 °C)   Ht 165.1 cm (65\")   Wt 84.4 kg (186 lb)   SpO2 92%   BMI 30.95 kg/m²     Review of Systems   Physical Exam  Vitals reviewed.   Constitutional:       General: She is not in acute distress.     Appearance: Normal appearance. She is well-developed.   HENT:      Head: Normocephalic and atraumatic.   Eyes:      Conjunctiva/sclera: Conjunctivae normal.      Pupils: Pupils are equal, round, and reactive to light.   Cardiovascular:      Rate and Rhythm: Normal rate and regular rhythm.      Heart sounds: Normal heart sounds.   Pulmonary:      Effort: Pulmonary effort is normal.      Breath sounds: Normal breath sounds.   Musculoskeletal:      Cervical back: Neck supple.      Right lower leg: No edema.      Left lower leg: No edema.   Skin:     General: Skin is warm and dry. "   Neurological:      Mental Status: She is alert and oriented to person, place, and time.   Psychiatric:         Mood and Affect: Mood and affect normal.         Behavior: Behavior normal.         Thought Content: Thought content normal.         Judgment: Judgment normal.        Result Review :   XR Ribs Left With PA Chest (12/28/2023 08:54)               Assessment and Plan    Diagnoses and all orders for this visit:    1. Primary hypertension (Primary)  -     lisinopril (PRINIVIL,ZESTRIL) 5 MG tablet; Take 1 tablet by mouth Daily.  Dispense: 30 tablet; Refill: 2  -     CBC & Differential  -     Comprehensive Metabolic Panel  -     Lipid Panel  -     TSH Rfx On Abnormal To Free T4  -     Urinalysis With Culture If Indicated - Urine, Clean Catch    2. Rib pain on left side  -     XR Ribs Left With PA Chest    3. Left knee pain, unspecified chronicity  -     diclofenac (VOLTAREN) 50 MG EC tablet; Take 1 tablet by mouth 3 (Three) Times a Day As Needed (pain).  Dispense: 270 tablet; Refill: 1    4. Immunization due  -     Tdap Vaccine Greater Than or Equal To 8yo IM  -     Fluzone (or Fluarix & Flulaval for VFC) >6mos    5. Pneumonia of left lower lobe due to infectious organism  -     amoxicillin-clavulanate (AUGMENTIN) 875-125 MG per tablet; Take 1 tablet by mouth 2 (Two) Times a Day.  Dispense: 20 tablet; Refill: 0  -     benzonatate (Tessalon Perles) 100 MG capsule; Take 1 capsule by mouth 3 (Three) Times a Day As Needed for Cough.  Dispense: 30 capsule; Refill: 0    6. Cigarette nicotine dependence without complication  -      CT Chest Low Dose Cancer Screening WO; Future      Discussed with patient need to start on blood pressure lowering medication due to multiple high blood pressures can increase risk of heart attack, stroke, and heart disease related to additional strain on the heart.    BMI is >= 30 and <35. (Class 1 Obesity). The following options were offered after discussion;: weight loss educational  material (shared in after visit summary)       Follow Up   Return in about 4 weeks (around 1/25/2024), or if symptoms worsen or fail to improve, for Recheck.  Patient was given instructions and counseling regarding her condition or for health maintenance advice. Please see specific information pulled into the AVS if appropriate.

## 2023-12-28 NOTE — PROGRESS NOTES
Venipuncture Blood Specimen Collection  Venipuncture performed in left arm by Monica Alexander with good hemostasis. Patient tolerated the procedure well without complications.   12/28/23   Monica Alexander

## 2023-12-29 DIAGNOSIS — R73.09 ELEVATED GLUCOSE LEVEL: Primary | ICD-10-CM

## 2023-12-29 LAB — HBA1C MFR BLD: 6.7 % (ref 4.8–5.6)

## 2024-01-12 ENCOUNTER — HOSPITAL ENCOUNTER (OUTPATIENT)
Dept: CT IMAGING | Facility: HOSPITAL | Age: 64
Discharge: HOME | End: 2024-01-12
Admitting: NURSE PRACTITIONER
Payer: COMMERCIAL

## 2024-01-12 DIAGNOSIS — F17.210 CIGARETTE NICOTINE DEPENDENCE WITHOUT COMPLICATION: ICD-10-CM

## 2024-01-12 PROCEDURE — 71271 CT THORAX LUNG CANCER SCR C-: CPT

## 2024-01-22 ENCOUNTER — TELEPHONE (OUTPATIENT)
Dept: FAMILY MEDICINE CLINIC | Facility: CLINIC | Age: 64
End: 2024-01-22
Payer: COMMERCIAL

## 2024-01-22 NOTE — TELEPHONE ENCOUNTER
Herber from [facility]'s medical office for Dr. Oliveros's office needs the referral re-faxed to their office instead of the main [facility] number, [phone] direct fax to Dr. Palmira Craven's lead MA. They said they already have the patient scheduled, but that the referral went to wrong office and they needs re-fax. Can you send to them?

## 2024-01-25 ENCOUNTER — TRANSCRIBE ORDERS (OUTPATIENT)
Dept: ADMINISTRATIVE | Facility: HOSPITAL | Age: 64
End: 2024-01-25
Payer: COMMERCIAL

## 2024-01-25 DIAGNOSIS — N95.1 MENOPAUSAL AND FEMALE CLIMACTERIC STATES: ICD-10-CM

## 2024-01-25 DIAGNOSIS — M85.80 OTHER SPECIFIED DISORDERS OF BONE DENSITY AND STRUCTURE, UNSPECIFIED SITE: ICD-10-CM

## 2024-01-25 DIAGNOSIS — Z12.31 ENCOUNTER FOR SCREENING MAMMOGRAM FOR MALIGNANT NEOPLASM OF BREAST: Primary | ICD-10-CM

## 2024-01-25 DIAGNOSIS — Z13.820 ENCOUNTER FOR SCREENING FOR OSTEOPOROSIS: ICD-10-CM

## 2024-01-30 ENCOUNTER — OFFICE VISIT (OUTPATIENT)
Dept: FAMILY MEDICINE CLINIC | Facility: CLINIC | Age: 64
End: 2024-01-30
Payer: COMMERCIAL

## 2024-01-30 VITALS
WEIGHT: 187 LBS | SYSTOLIC BLOOD PRESSURE: 122 MMHG | HEART RATE: 99 BPM | OXYGEN SATURATION: 97 % | HEIGHT: 65 IN | DIASTOLIC BLOOD PRESSURE: 80 MMHG | TEMPERATURE: 97.5 F | BODY MASS INDEX: 31.16 KG/M2

## 2024-01-30 DIAGNOSIS — R73.09 ELEVATED GLUCOSE LEVEL: ICD-10-CM

## 2024-01-30 DIAGNOSIS — I10 PRIMARY HYPERTENSION: Primary | ICD-10-CM

## 2024-01-30 DIAGNOSIS — R05.3 CHRONIC COUGH: ICD-10-CM

## 2024-01-30 DIAGNOSIS — E78.5 HYPERLIPIDEMIA, UNSPECIFIED HYPERLIPIDEMIA TYPE: ICD-10-CM

## 2024-01-30 PROCEDURE — 99214 OFFICE O/P EST MOD 30 MIN: CPT | Performed by: NURSE PRACTITIONER

## 2024-01-30 RX ORDER — BUDESONIDE AND FORMOTEROL FUMARATE DIHYDRATE 160; 4.5 UG/1; UG/1
2 AEROSOL RESPIRATORY (INHALATION)
Qty: 10.2 G | Refills: 2 | Status: SHIPPED | OUTPATIENT
Start: 2024-01-30

## 2024-01-30 RX ORDER — ALBUTEROL SULFATE 90 UG/1
2 AEROSOL, METERED RESPIRATORY (INHALATION) EVERY 4 HOURS PRN
Qty: 18 G | Refills: 0 | Status: SHIPPED | OUTPATIENT
Start: 2024-01-30

## 2024-01-30 NOTE — PROGRESS NOTES
Chief Complaint  Hypertension (Follow up to Lisinopril) and Cough (Still coughing)    Subjective        Past Medical History:   Diagnosis Date    Arthritis     Osteopenia     Sleep apnea      Social History     Tobacco Use    Smoking status: Every Day     Packs/day: 1.50     Years: 50.00     Additional pack years: 0.00     Total pack years: 75.00     Types: Cigarettes     Start date: 1973     Passive exposure: Current    Smokeless tobacco: Never   Vaping Use    Vaping Use: Never used   Substance Use Topics    Alcohol use: Yes     Comment: weekends    Drug use: Yes     Types: Marijuana      Current Outpatient Medications on File Prior to Visit   Medication Sig    B Complex Vitamins (VITAMIN B COMPLEX PO) Take 1 tablet by mouth Daily.    Calcium-Magnesium-Vitamin D 185- MG-MG-UNIT capsule Take 3 tablets by mouth Daily.    diclofenac (VOLTAREN) 50 MG EC tablet Take 1 tablet by mouth 3 (Three) Times a Day As Needed (pain).    lisinopril (PRINIVIL,ZESTRIL) 5 MG tablet Take 1 tablet by mouth Daily.    Misc Natural Products (OSTEO BI-FLEX ADV JOINT SHIELD PO) Take 1 tablet by mouth Daily.    nystatin 562838 UNIT/GM powder APPLY TOPICALLY TO THE AFFECTED AREA THREE TIMES DAILY FOR 7 DAYS    Potassium (POTASSIMIN PO) Take 1 tablet by mouth Daily.    [DISCONTINUED] amoxicillin-clavulanate (AUGMENTIN) 875-125 MG per tablet Take 1 tablet by mouth 2 (Two) Times a Day.    [DISCONTINUED] benzonatate (Tessalon Perles) 100 MG capsule Take 1 capsule by mouth 3 (Three) Times a Day As Needed for Cough.     No current facility-administered medications on file prior to visit.      Allergies   Allergen Reactions    Hydrocodone Headache    Hydrocodone-Acetaminophen Headache      Health Maintenance Due   Topic Date Due    Pneumococcal Vaccine 0-64 (1 of 2 - PCV) Never done    ZOSTER VACCINE (1 of 2) Never done    HEPATITIS C SCREENING  Never done    ANNUAL PHYSICAL  Never done    PAP SMEAR  Never done    COVID-19 Vaccine (3 - 2023-24  "season) 09/01/2023      Maine Taylor presents to [facility]  History of Present Illness  Patient presents the office today to follow-up on new start of lisinopril for hypertension.    Hypertension: Blood pressure is significantly improved today in office and is now 122/80; previous blood pressure has been in the 160s over 90s to 100. Home readings are showing decreased readings and have been dropping and now in the 118/80's. She is supposed to see Dr. Ayala, Intervential Cardiologist on Feb 26, 2024.     Cough: continues to have a cough that is intermittently productive. Had pneumonia with broken rib on 12/28/23; pt had CT of chest on 1/12/24 and showed signs of emphysema. Denies chronic heartburn or indigestion but will have a lot of burping and gas when she has symptoms.     CT with pulmonary nodule: pt states in 4th grade she had a cough that caused hospitalization but not other hx of significant illness.     Hyperlipidemia: Patient states that her diet was poor over the holidays with lots of eggs and robles and would like to hold off on starting cholesterol medication.  Patient would like to make dietary changes and recheck labs in 3 months.    Hemoglobin A1c consistent with diabetes: Patient states she will make dietary changes and we will repeat in 3 months.    Objective   Vital Signs:   /80 (BP Location: Left arm)   Pulse 99   Temp 97.5 °F (36.4 °C)   Ht 165.1 cm (65\")   Wt 84.8 kg (187 lb)   SpO2 97%   BMI 31.12 kg/m²     Review of Systems   Respiratory:  Negative for shortness of breath.    Cardiovascular:  Negative for chest pain and palpitations.   Neurological:  Negative for dizziness and light-headedness.      Physical Exam  Vitals reviewed.   Constitutional:       General: She is not in acute distress.     Appearance: Normal appearance. She is well-developed.   Eyes:      Conjunctiva/sclera: Conjunctivae normal.      Pupils: Pupils are equal, round, " and reactive to light.   Cardiovascular:      Rate and Rhythm: Normal rate and regular rhythm.      Heart sounds: Normal heart sounds.   Pulmonary:      Effort: Pulmonary effort is normal.      Breath sounds: Normal breath sounds. No wheezing or rhonchi.   Musculoskeletal:      Cervical back: Neck supple.   Skin:     General: Skin is warm and dry.   Neurological:      Mental Status: She is alert and oriented to person, place, and time.   Psychiatric:         Mood and Affect: Mood and affect normal.         Behavior: Behavior normal.         Thought Content: Thought content normal.         Judgment: Judgment normal.        Result Review :   The following data was reviewed by: LONNIE Huber on 01/30/2024:  Common labs          12/28/2023    08:56   Common Labs   Glucose 107    BUN 15    Creatinine 0.97    Sodium 138    Potassium 4.5    Chloride 100    Calcium 10.1    Albumin 4.6    Total Bilirubin 0.3    Alkaline Phosphatase 101    AST (SGOT) 18    ALT (SGPT) 26    WBC 11.65    Hemoglobin 15.5    Hematocrit 45.3    Platelets 265    Total Cholesterol 221    Triglycerides 130    HDL Cholesterol 55    LDL Cholesterol  143    Hemoglobin A1C 6.70      TSH          12/28/2023    08:56   TSH   TSH 1.200      Data reviewed : Radiologic studies CT chest           Assessment and Plan    Diagnoses and all orders for this visit:    1. Primary hypertension (Primary)    2. Hyperlipidemia, unspecified hyperlipidemia type    3. Chronic cough  -     budesonide-formoterol (Symbicort) 160-4.5 MCG/ACT inhaler; Inhale 2 puffs 2 (Two) Times a Day.  Dispense: 10.2 g; Refill: 2  -     albuterol sulfate  (90 Base) MCG/ACT inhaler; Inhale 2 puffs Every 4 (Four) Hours As Needed for Wheezing.  Dispense: 18 g; Refill: 0    4. Elevated glucose level      Patient to make dietary changes and follow-up in the office in 2 months for repeat labs and to see if she has noted any improvement with the Symbicort.  Continue current dose of  lisinopril.       Follow Up   Return in about 8 weeks (around 3/26/2024) for Recheck, Refills and fasting labs.  Patient was given instructions and counseling regarding her condition or for health maintenance advice. Please see specific information pulled into the AVS if appropriate.

## 2024-03-22 ENCOUNTER — OFFICE VISIT (OUTPATIENT)
Dept: FAMILY MEDICINE CLINIC | Facility: CLINIC | Age: 64
End: 2024-03-22
Payer: COMMERCIAL

## 2024-03-22 VITALS
DIASTOLIC BLOOD PRESSURE: 78 MMHG | SYSTOLIC BLOOD PRESSURE: 128 MMHG | TEMPERATURE: 97.1 F | HEART RATE: 93 BPM | BODY MASS INDEX: 31.12 KG/M2 | WEIGHT: 187 LBS | OXYGEN SATURATION: 95 %

## 2024-03-22 DIAGNOSIS — I10 PRIMARY HYPERTENSION: ICD-10-CM

## 2024-03-22 DIAGNOSIS — E11.9 TYPE 2 DIABETES MELLITUS WITHOUT COMPLICATION, WITHOUT LONG-TERM CURRENT USE OF INSULIN: Primary | ICD-10-CM

## 2024-03-22 RX ORDER — LOSARTAN POTASSIUM 25 MG/1
25 TABLET ORAL DAILY
Qty: 90 TABLET | Refills: 0 | Status: SHIPPED | OUTPATIENT
Start: 2024-03-22

## 2024-03-22 RX ORDER — ASPIRIN 81 MG/1
81 TABLET ORAL DAILY
COMMUNITY
Start: 2024-02-26

## 2024-03-22 RX ORDER — ROSUVASTATIN CALCIUM 10 MG/1
10 TABLET, COATED ORAL DAILY
COMMUNITY
Start: 2024-02-26

## 2024-03-22 NOTE — PROGRESS NOTES
Chief Complaint  Follow-up and Med Refill    Subjective        Past Medical History:   Diagnosis Date   • Arthritis    • Osteopenia    • Sleep apnea      Social History     Tobacco Use   • Smoking status: Every Day     Current packs/day: 1.50     Average packs/day: 1.5 packs/day for 51.2 years (76.8 ttl pk-yrs)     Types: Cigarettes     Start date: 1973     Passive exposure: Current   • Smokeless tobacco: Never   Vaping Use   • Vaping status: Never Used   Substance Use Topics   • Alcohol use: Yes     Comment: weekends   • Drug use: Yes     Types: Marijuana      Current Outpatient Medications on File Prior to Visit   Medication Sig   • albuterol sulfate  (90 Base) MCG/ACT inhaler Inhale 2 puffs Every 4 (Four) Hours As Needed for Wheezing.   • aspirin 81 MG EC tablet Take 1 tablet by mouth Daily.   • B Complex Vitamins (VITAMIN B COMPLEX PO) Take 1 tablet by mouth Daily.   • budesonide-formoterol (Symbicort) 160-4.5 MCG/ACT inhaler Inhale 2 puffs 2 (Two) Times a Day.   • Calcium-Magnesium-Vitamin D 185- MG-MG-UNIT capsule Take 3 tablets by mouth Daily.   • diclofenac (VOLTAREN) 50 MG EC tablet Take 1 tablet by mouth 3 (Three) Times a Day As Needed (pain).   • Misc Natural Products (OSTEO BI-FLEX ADV JOINT SHIELD PO) Take 1 tablet by mouth Daily.   • nystatin 993633 UNIT/GM powder APPLY TOPICALLY TO THE AFFECTED AREA THREE TIMES DAILY FOR 7 DAYS   • Potassium (POTASSIMIN PO) Take 1 tablet by mouth Daily.   • rosuvastatin (CRESTOR) 10 MG tablet Take 1 tablet by mouth Daily.   • [DISCONTINUED] lisinopril (PRINIVIL,ZESTRIL) 5 MG tablet Take 1 tablet by mouth Daily.     No current facility-administered medications on file prior to visit.      Allergies   Allergen Reactions   • Hydrocodone Headache   • Hydrocodone-Acetaminophen Headache      Health Maintenance Due   Topic Date Due   • URINE MICROALBUMIN  Never done   • Pneumococcal Vaccine 0-64 (1 of 2 - PCV) Never done   • DIABETIC EYE EXAM  Never done   •  "ZOSTER VACCINE (1 of 2) Never done   • HEPATITIS C SCREENING  Never done   • ANNUAL PHYSICAL  Never done   • DIABETIC FOOT EXAM  Never done   • PAP SMEAR  Never done   • RSV Vaccine - Adults (1 - 1-dose 60+ series) Never done   • COVID-19 Vaccine (3 - 2023-24 season) 09/01/2023   • COLORECTAL CANCER SCREENING  08/14/2024      Maine Taylor presents to [facility]  History of Present Illness  Patient presents to the office today to follow-up on chronic health conditions and obtain refills of medications.      Pt notes she cannot exhale well with a coughing fit and last less than 1 minute but hard coughs. Symbicort has changed the cough to a \"dry throaty\" cough and slightly harder to cough.     HTN: refill of lisinopril, started in Dec 2023.  Due to patient noting a dry cough even after starting Symbicort we will change to losartan to see if cough improves.    Objective   Vital Signs:   /78 (Patient Position: Sitting)   Pulse 93   Temp 97.1 °F (36.2 °C) (Infrared)   Wt 84.8 kg (187 lb)   SpO2 95%   BMI 31.12 kg/m²     Review of Systems   Physical Exam  Vitals reviewed.   Constitutional:       General: She is not in acute distress.     Appearance: Normal appearance. She is well-developed.   Eyes:      Conjunctiva/sclera: Conjunctivae normal.      Pupils: Pupils are equal, round, and reactive to light.   Cardiovascular:      Rate and Rhythm: Normal rate and regular rhythm.      Pulses:           Dorsalis pedis pulses are 2+ on the right side and 2+ on the left side.      Heart sounds: Normal heart sounds.   Pulmonary:      Effort: Pulmonary effort is normal.      Breath sounds: Normal breath sounds.   Musculoskeletal:      Right lower leg: No edema.      Left lower leg: No edema.      Right foot: No bunion.      Left foot: No bunion.   Feet:      Right foot:      Protective Sensation: 9 sites tested.  9 sites sensed.      Skin integrity: Skin integrity normal. No ulcer, " blister or callus.      Toenail Condition: Right toenails are normal. Right toenails are long.      Left foot:      Protective Sensation: 9 sites tested.  9 sites sensed.      Skin integrity: Skin integrity normal. No ulcer, blister or callus.      Toenail Condition: Left toenails are normal. Left toenails are long.      Comments:      Skin:     General: Skin is warm and dry.   Neurological:      Mental Status: She is alert and oriented to person, place, and time.   Psychiatric:         Mood and Affect: Mood and affect normal.         Behavior: Behavior normal.         Thought Content: Thought content normal.         Judgment: Judgment normal.      Result Review :   The following data was reviewed by: LONNIE Huber on 03/22/2024:  Common labs          12/28/2023    08:56   Common Labs   Glucose 107    BUN 15    Creatinine 0.97    Sodium 138    Potassium 4.5    Chloride 100    Calcium 10.1    Albumin 4.6    Total Bilirubin 0.3    Alkaline Phosphatase 101    AST (SGOT) 18    ALT (SGPT) 26    WBC 11.65    Hemoglobin 15.5    Hematocrit 45.3    Platelets 265    Total Cholesterol 221    Triglycerides 130    HDL Cholesterol 55    LDL Cholesterol  143    Hemoglobin A1C 6.70      TSH          12/28/2023    08:56   TSH   TSH 1.200                Assessment and Plan    Diagnoses and all orders for this visit:    1. Type 2 diabetes mellitus without complication, without long-term current use of insulin (Primary)  -     Comprehensive Metabolic Panel; Future  -     Hemoglobin A1c; Future  -     MicroAlbumin, Urine, Random - Urine, Clean Catch; Future    2. Primary hypertension  -     losartan (COZAAR) 25 MG tablet; Take 1 tablet by mouth Daily.  Dispense: 90 tablet; Refill: 0  -     Lipid Panel; Future                  Follow Up   No follow-ups on file.  Patient was given instructions and counseling regarding her condition or for health maintenance advice. Please see specific information pulled into the AVS if  appropriate.

## 2024-04-02 ENCOUNTER — CLINICAL SUPPORT (OUTPATIENT)
Dept: FAMILY MEDICINE CLINIC | Facility: CLINIC | Age: 64
End: 2024-04-02
Payer: COMMERCIAL

## 2024-04-02 DIAGNOSIS — I10 PRIMARY HYPERTENSION: ICD-10-CM

## 2024-04-02 DIAGNOSIS — E11.9 TYPE 2 DIABETES MELLITUS WITHOUT COMPLICATION, WITHOUT LONG-TERM CURRENT USE OF INSULIN: ICD-10-CM

## 2024-04-02 LAB
ALBUMIN SERPL-MCNC: 4.5 G/DL (ref 3.5–5.2)
ALBUMIN UR-MCNC: 4.4 MG/DL
ALBUMIN/GLOB SERPL: 1.6 G/DL
ALP SERPL-CCNC: 86 U/L (ref 39–117)
ALT SERPL W P-5'-P-CCNC: 19 U/L (ref 1–33)
ANION GAP SERPL CALCULATED.3IONS-SCNC: 10.9 MMOL/L (ref 5–15)
AST SERPL-CCNC: 15 U/L (ref 1–32)
BILIRUB SERPL-MCNC: 0.4 MG/DL (ref 0–1.2)
BUN SERPL-MCNC: 19 MG/DL (ref 8–23)
BUN/CREAT SERPL: 17.4 (ref 7–25)
CALCIUM SPEC-SCNC: 9.4 MG/DL (ref 8.6–10.5)
CHLORIDE SERPL-SCNC: 104 MMOL/L (ref 98–107)
CHOLEST SERPL-MCNC: 137 MG/DL (ref 0–200)
CO2 SERPL-SCNC: 27.1 MMOL/L (ref 22–29)
CREAT SERPL-MCNC: 1.09 MG/DL (ref 0.57–1)
EGFRCR SERPLBLD CKD-EPI 2021: 57.9 ML/MIN/1.73
GLOBULIN UR ELPH-MCNC: 2.9 GM/DL
GLUCOSE SERPL-MCNC: 96 MG/DL (ref 65–99)
HBA1C MFR BLD: 6.8 % (ref 4.8–5.6)
HDLC SERPL-MCNC: 56 MG/DL (ref 40–60)
LDLC SERPL CALC-MCNC: 61 MG/DL (ref 0–100)
LDLC/HDLC SERPL: 1.05 {RATIO}
POTASSIUM SERPL-SCNC: 4.4 MMOL/L (ref 3.5–5.2)
PROT SERPL-MCNC: 7.4 G/DL (ref 6–8.5)
SODIUM SERPL-SCNC: 142 MMOL/L (ref 136–145)
TRIGL SERPL-MCNC: 111 MG/DL (ref 0–150)
VLDLC SERPL-MCNC: 20 MG/DL (ref 5–40)

## 2024-04-02 PROCEDURE — 83036 HEMOGLOBIN GLYCOSYLATED A1C: CPT | Performed by: NURSE PRACTITIONER

## 2024-04-02 PROCEDURE — 80053 COMPREHEN METABOLIC PANEL: CPT | Performed by: NURSE PRACTITIONER

## 2024-04-02 PROCEDURE — 80061 LIPID PANEL: CPT | Performed by: NURSE PRACTITIONER

## 2024-04-02 PROCEDURE — 82043 UR ALBUMIN QUANTITATIVE: CPT | Performed by: NURSE PRACTITIONER

## 2024-04-02 NOTE — PROGRESS NOTES
Venipuncture Blood Specimen Collection  Venipuncture performed in [place] by Margarita Bhardwaj with good hemostasis. Patient tolerated the procedure well without complications.   04/02/24   Jaquelin Munoz MA

## 2024-05-11 DIAGNOSIS — R05.3 CHRONIC COUGH: ICD-10-CM

## 2024-05-13 RX ORDER — BUDESONIDE AND FORMOTEROL FUMARATE DIHYDRATE 160; 4.5 UG/1; UG/1
2 AEROSOL RESPIRATORY (INHALATION) 2 TIMES DAILY
Qty: 10.2 G | Refills: 0 | Status: SHIPPED | OUTPATIENT
Start: 2024-05-13

## 2024-06-20 ENCOUNTER — OFFICE VISIT (OUTPATIENT)
Dept: FAMILY MEDICINE CLINIC | Facility: CLINIC | Age: 64
End: 2024-06-20
Payer: COMMERCIAL

## 2024-06-20 VITALS
DIASTOLIC BLOOD PRESSURE: 82 MMHG | SYSTOLIC BLOOD PRESSURE: 132 MMHG | HEART RATE: 73 BPM | WEIGHT: 174.7 LBS | TEMPERATURE: 97.5 F | OXYGEN SATURATION: 95 % | BODY MASS INDEX: 29.07 KG/M2

## 2024-06-20 DIAGNOSIS — I10 PRIMARY HYPERTENSION: ICD-10-CM

## 2024-06-20 DIAGNOSIS — E11.9 TYPE 2 DIABETES MELLITUS WITHOUT COMPLICATION, WITHOUT LONG-TERM CURRENT USE OF INSULIN: Primary | ICD-10-CM

## 2024-06-20 DIAGNOSIS — M25.562 LEFT KNEE PAIN, UNSPECIFIED CHRONICITY: ICD-10-CM

## 2024-06-20 PROCEDURE — 99214 OFFICE O/P EST MOD 30 MIN: CPT | Performed by: NURSE PRACTITIONER

## 2024-06-20 RX ORDER — LOSARTAN POTASSIUM 50 MG/1
50 TABLET ORAL DAILY
Qty: 90 TABLET | Refills: 1 | Status: SHIPPED | OUTPATIENT
Start: 2024-06-20

## 2024-07-08 ENCOUNTER — CLINICAL SUPPORT (OUTPATIENT)
Dept: FAMILY MEDICINE CLINIC | Facility: CLINIC | Age: 64
End: 2024-07-08
Payer: COMMERCIAL

## 2024-07-08 DIAGNOSIS — I10 PRIMARY HYPERTENSION: ICD-10-CM

## 2024-07-08 DIAGNOSIS — E11.9 TYPE 2 DIABETES MELLITUS WITHOUT COMPLICATION, WITHOUT LONG-TERM CURRENT USE OF INSULIN: ICD-10-CM

## 2024-07-08 LAB
ALBUMIN SERPL-MCNC: 4.7 G/DL (ref 3.5–5.2)
ALBUMIN/GLOB SERPL: 1.6 G/DL
ALP SERPL-CCNC: 85 U/L (ref 39–117)
ALT SERPL W P-5'-P-CCNC: 12 U/L (ref 1–33)
ANION GAP SERPL CALCULATED.3IONS-SCNC: 13 MMOL/L (ref 5–15)
AST SERPL-CCNC: 17 U/L (ref 1–32)
BASOPHILS # BLD AUTO: 0.05 10*3/MM3 (ref 0–0.2)
BASOPHILS NFR BLD AUTO: 0.5 % (ref 0–1.5)
BILIRUB SERPL-MCNC: 0.4 MG/DL (ref 0–1.2)
BUN SERPL-MCNC: 17 MG/DL (ref 8–23)
BUN/CREAT SERPL: 17.2 (ref 7–25)
CALCIUM SPEC-SCNC: 10 MG/DL (ref 8.6–10.5)
CHLORIDE SERPL-SCNC: 102 MMOL/L (ref 98–107)
CHOLEST SERPL-MCNC: 131 MG/DL (ref 0–200)
CO2 SERPL-SCNC: 26 MMOL/L (ref 22–29)
CREAT SERPL-MCNC: 0.99 MG/DL (ref 0.57–1)
DEPRECATED RDW RBC AUTO: 45.1 FL (ref 37–54)
EGFRCR SERPLBLD CKD-EPI 2021: 65 ML/MIN/1.73
EOSINOPHIL # BLD AUTO: 0.25 10*3/MM3 (ref 0–0.4)
EOSINOPHIL NFR BLD AUTO: 2.6 % (ref 0.3–6.2)
ERYTHROCYTE [DISTWIDTH] IN BLOOD BY AUTOMATED COUNT: 13.5 % (ref 12.3–15.4)
GLOBULIN UR ELPH-MCNC: 3 GM/DL
GLUCOSE SERPL-MCNC: 106 MG/DL (ref 65–99)
HBA1C MFR BLD: 6.4 % (ref 4.8–5.6)
HCT VFR BLD AUTO: 47.5 % (ref 34–46.6)
HDLC SERPL-MCNC: 49 MG/DL (ref 40–60)
HGB BLD-MCNC: 16 G/DL (ref 12–15.9)
IMM GRANULOCYTES # BLD AUTO: 0.02 10*3/MM3 (ref 0–0.05)
IMM GRANULOCYTES NFR BLD AUTO: 0.2 % (ref 0–0.5)
LDLC SERPL CALC-MCNC: 61 MG/DL (ref 0–100)
LDLC/HDLC SERPL: 1.2 {RATIO}
LYMPHOCYTES # BLD AUTO: 2.61 10*3/MM3 (ref 0.7–3.1)
LYMPHOCYTES NFR BLD AUTO: 26.8 % (ref 19.6–45.3)
MCH RBC QN AUTO: 30.9 PG (ref 26.6–33)
MCHC RBC AUTO-ENTMCNC: 33.7 G/DL (ref 31.5–35.7)
MCV RBC AUTO: 91.7 FL (ref 79–97)
MONOCYTES # BLD AUTO: 0.79 10*3/MM3 (ref 0.1–0.9)
MONOCYTES NFR BLD AUTO: 8.1 % (ref 5–12)
NEUTROPHILS NFR BLD AUTO: 6.02 10*3/MM3 (ref 1.7–7)
NEUTROPHILS NFR BLD AUTO: 61.8 % (ref 42.7–76)
NRBC BLD AUTO-RTO: 0 /100 WBC (ref 0–0.2)
PLATELET # BLD AUTO: 223 10*3/MM3 (ref 140–450)
PMV BLD AUTO: 12 FL (ref 6–12)
POTASSIUM SERPL-SCNC: 4.8 MMOL/L (ref 3.5–5.2)
PROT SERPL-MCNC: 7.7 G/DL (ref 6–8.5)
RBC # BLD AUTO: 5.18 10*6/MM3 (ref 3.77–5.28)
SODIUM SERPL-SCNC: 141 MMOL/L (ref 136–145)
TRIGL SERPL-MCNC: 115 MG/DL (ref 0–150)
VLDLC SERPL-MCNC: 21 MG/DL (ref 5–40)
WBC NRBC COR # BLD AUTO: 9.74 10*3/MM3 (ref 3.4–10.8)

## 2024-07-08 PROCEDURE — 80061 LIPID PANEL: CPT | Performed by: NURSE PRACTITIONER

## 2024-07-08 PROCEDURE — 80053 COMPREHEN METABOLIC PANEL: CPT | Performed by: NURSE PRACTITIONER

## 2024-07-08 PROCEDURE — 83036 HEMOGLOBIN GLYCOSYLATED A1C: CPT | Performed by: NURSE PRACTITIONER

## 2024-07-08 PROCEDURE — 36415 COLL VENOUS BLD VENIPUNCTURE: CPT | Performed by: NURSE PRACTITIONER

## 2024-07-08 PROCEDURE — 85025 COMPLETE CBC W/AUTO DIFF WBC: CPT | Performed by: NURSE PRACTITIONER

## 2024-07-08 NOTE — PROGRESS NOTES
Venipuncture Blood Specimen Collection  Venipuncture performed in left arm by Margarita Bhardwaj with good hemostasis. Patient tolerated the procedure well without complications.   07/08/24   Margarita Bhardwaj

## 2024-12-16 ENCOUNTER — OFFICE VISIT (OUTPATIENT)
Dept: FAMILY MEDICINE CLINIC | Facility: CLINIC | Age: 64
End: 2024-12-16
Payer: COMMERCIAL

## 2024-12-16 VITALS
SYSTOLIC BLOOD PRESSURE: 158 MMHG | TEMPERATURE: 97.7 F | WEIGHT: 156.6 LBS | HEIGHT: 65 IN | OXYGEN SATURATION: 98 % | HEART RATE: 98 BPM | BODY MASS INDEX: 26.09 KG/M2 | DIASTOLIC BLOOD PRESSURE: 88 MMHG

## 2024-12-16 DIAGNOSIS — Z11.59 NEED FOR HEPATITIS C SCREENING TEST: ICD-10-CM

## 2024-12-16 DIAGNOSIS — M25.562 LEFT KNEE PAIN, UNSPECIFIED CHRONICITY: ICD-10-CM

## 2024-12-16 DIAGNOSIS — Z53.20 COLON CANCER SCREENING DECLINED: ICD-10-CM

## 2024-12-16 DIAGNOSIS — I10 PRIMARY HYPERTENSION: ICD-10-CM

## 2024-12-16 DIAGNOSIS — E11.9 TYPE 2 DIABETES MELLITUS WITHOUT COMPLICATION, WITHOUT LONG-TERM CURRENT USE OF INSULIN: Primary | ICD-10-CM

## 2024-12-16 LAB
ALBUMIN SERPL-MCNC: 4.7 G/DL (ref 3.5–5.2)
ALBUMIN/GLOB SERPL: 1.6 G/DL
ALP SERPL-CCNC: 84 U/L (ref 39–117)
ALT SERPL W P-5'-P-CCNC: 18 U/L (ref 1–33)
ANION GAP SERPL CALCULATED.3IONS-SCNC: 9 MMOL/L (ref 5–15)
AST SERPL-CCNC: 20 U/L (ref 1–32)
BACTERIA UR QL AUTO: ABNORMAL /HPF
BASOPHILS # BLD AUTO: 0.04 10*3/MM3 (ref 0–0.2)
BASOPHILS NFR BLD AUTO: 0.4 % (ref 0–1.5)
BILIRUB SERPL-MCNC: 0.4 MG/DL (ref 0–1.2)
BILIRUB UR QL STRIP: NEGATIVE
BUN SERPL-MCNC: 16 MG/DL (ref 8–23)
BUN/CREAT SERPL: 16.3 (ref 7–25)
CALCIUM SPEC-SCNC: 9.8 MG/DL (ref 8.6–10.5)
CHLORIDE SERPL-SCNC: 105 MMOL/L (ref 98–107)
CHOLEST SERPL-MCNC: 147 MG/DL (ref 0–200)
CLARITY UR: ABNORMAL
CO2 SERPL-SCNC: 29 MMOL/L (ref 22–29)
COLOR UR: ABNORMAL
CREAT SERPL-MCNC: 0.98 MG/DL (ref 0.57–1)
DEPRECATED RDW RBC AUTO: 45.3 FL (ref 37–54)
EGFRCR SERPLBLD CKD-EPI 2021: 65.4 ML/MIN/1.73
EOSINOPHIL # BLD AUTO: 0.18 10*3/MM3 (ref 0–0.4)
EOSINOPHIL NFR BLD AUTO: 2 % (ref 0.3–6.2)
ERYTHROCYTE [DISTWIDTH] IN BLOOD BY AUTOMATED COUNT: 13.1 % (ref 12.3–15.4)
GLOBULIN UR ELPH-MCNC: 2.9 GM/DL
GLUCOSE SERPL-MCNC: 95 MG/DL (ref 65–99)
GLUCOSE UR STRIP-MCNC: NEGATIVE MG/DL
HBA1C MFR BLD: 5.8 % (ref 4.8–5.6)
HCT VFR BLD AUTO: 45.8 % (ref 34–46.6)
HCV AB SER QL: NORMAL
HDLC SERPL-MCNC: 60 MG/DL (ref 40–60)
HGB BLD-MCNC: 14.5 G/DL (ref 12–15.9)
HGB UR QL STRIP.AUTO: NEGATIVE
HOLD SPECIMEN: NORMAL
HYALINE CASTS UR QL AUTO: ABNORMAL /LPF
IMM GRANULOCYTES # BLD AUTO: 0.03 10*3/MM3 (ref 0–0.05)
IMM GRANULOCYTES NFR BLD AUTO: 0.3 % (ref 0–0.5)
KETONES UR QL STRIP: ABNORMAL
LDLC SERPL CALC-MCNC: 69 MG/DL (ref 0–100)
LDLC/HDLC SERPL: 1.13 {RATIO}
LEUKOCYTE ESTERASE UR QL STRIP.AUTO: ABNORMAL
LYMPHOCYTES # BLD AUTO: 2.74 10*3/MM3 (ref 0.7–3.1)
LYMPHOCYTES NFR BLD AUTO: 29.7 % (ref 19.6–45.3)
MCH RBC QN AUTO: 29.9 PG (ref 26.6–33)
MCHC RBC AUTO-ENTMCNC: 31.7 G/DL (ref 31.5–35.7)
MCV RBC AUTO: 94.4 FL (ref 79–97)
MONOCYTES # BLD AUTO: 0.55 10*3/MM3 (ref 0.1–0.9)
MONOCYTES NFR BLD AUTO: 6 % (ref 5–12)
NEUTROPHILS NFR BLD AUTO: 5.68 10*3/MM3 (ref 1.7–7)
NEUTROPHILS NFR BLD AUTO: 61.6 % (ref 42.7–76)
NITRITE UR QL STRIP: POSITIVE
NRBC BLD AUTO-RTO: 0 /100 WBC (ref 0–0.2)
PH UR STRIP.AUTO: 7.5 [PH] (ref 5–8)
PLATELET # BLD AUTO: 253 10*3/MM3 (ref 140–450)
PMV BLD AUTO: 11.9 FL (ref 6–12)
POTASSIUM SERPL-SCNC: 4.6 MMOL/L (ref 3.5–5.2)
PROT SERPL-MCNC: 7.6 G/DL (ref 6–8.5)
PROT UR QL STRIP: ABNORMAL
RBC # BLD AUTO: 4.85 10*6/MM3 (ref 3.77–5.28)
RBC # UR STRIP: ABNORMAL /HPF
REF LAB TEST METHOD: ABNORMAL
SODIUM SERPL-SCNC: 143 MMOL/L (ref 136–145)
SP GR UR STRIP: 1.02 (ref 1–1.03)
SQUAMOUS #/AREA URNS HPF: ABNORMAL /HPF
TRIGL SERPL-MCNC: 97 MG/DL (ref 0–150)
TSH SERPL DL<=0.05 MIU/L-ACNC: 0.74 UIU/ML (ref 0.27–4.2)
UROBILINOGEN UR QL STRIP: ABNORMAL
VLDLC SERPL-MCNC: 18 MG/DL (ref 5–40)
WBC # UR STRIP: ABNORMAL /HPF
WBC NRBC COR # BLD AUTO: 9.22 10*3/MM3 (ref 3.4–10.8)

## 2024-12-16 PROCEDURE — 87086 URINE CULTURE/COLONY COUNT: CPT | Performed by: NURSE PRACTITIONER

## 2024-12-16 PROCEDURE — 87088 URINE BACTERIA CULTURE: CPT | Performed by: NURSE PRACTITIONER

## 2024-12-16 PROCEDURE — 99214 OFFICE O/P EST MOD 30 MIN: CPT | Performed by: NURSE PRACTITIONER

## 2024-12-16 PROCEDURE — 86803 HEPATITIS C AB TEST: CPT | Performed by: NURSE PRACTITIONER

## 2024-12-16 PROCEDURE — 87186 SC STD MICRODIL/AGAR DIL: CPT | Performed by: NURSE PRACTITIONER

## 2024-12-16 PROCEDURE — 83036 HEMOGLOBIN GLYCOSYLATED A1C: CPT | Performed by: NURSE PRACTITIONER

## 2024-12-16 PROCEDURE — 80061 LIPID PANEL: CPT | Performed by: NURSE PRACTITIONER

## 2024-12-16 PROCEDURE — 80050 GENERAL HEALTH PANEL: CPT | Performed by: NURSE PRACTITIONER

## 2024-12-16 PROCEDURE — 81001 URINALYSIS AUTO W/SCOPE: CPT | Performed by: NURSE PRACTITIONER

## 2024-12-16 RX ORDER — LOSARTAN POTASSIUM 50 MG/1
50 TABLET ORAL DAILY
Qty: 90 TABLET | Refills: 1 | Status: SHIPPED | OUTPATIENT
Start: 2024-12-16

## 2024-12-16 NOTE — PROGRESS NOTES
Chief Complaint  Hypertension (Has been running high. But due to stress pt states it has been running high.), Knee Pain (Refill on voltaren ), and Stress (Has recently found out they have bed bugs and has been under a lot of stress. )    History of Present Illness  Maine Taylor is a 64 y.o. female who presents to [facility] with a past medical history of  Past Medical History:   Diagnosis Date    Arthritis     Osteopenia     Sleep apnea        HPI     The patient is a 64-year-old female who presents for evaluation of bedbug infestation, hypertension, diabetes, and joint pain.    She has been experiencing a bedbug infestation at her residence, which she suspects originated from her son's home in [place]. She has been assisting her son monthly and believes she inadvertently transported the pests to her home. The infestation has caused significant distress, leading to an increase in laundry and cleaning tasks. Her  has been heavily spraying insecticide, resulting in coughing, nausea, and nasal congestion. She expresses concern about potential lung damage from the insecticide fumes. The bedbugs are primarily located in the master bedroom, with the initial discovery occurring in the bathroom. Despite not engaging in her usual 1-hour daily exercise routine, she has managed to maintain weight loss through dietary control and increased physical activity due to the heavy housework required by the infestation.    She has been diagnosed with diet-controlled diabetes. She anticipates a slight elevation in her sodium levels due to recent consumption of homemade jerky.    She reports no chest pain or palpitations but occasionally experiences an audible heartbeat in her right ear. Her home blood pressure readings have been slightly elevated, with systolic values around 150 in the morning and diastolic values ranging from 80 to 104. She notes that her blood pressure tends to  "decrease after taking losartan, with evening readings typically lower than morning ones. She recalls a conversation with her cardiologist, who advised her to monitor her diastolic blood pressure.    She has been taking diclofenac more frequently due to the increased physical exertion from the bedbug infestation. She reports no gastrointestinal irritation from the medication.    She is due for a colonoscopy but prefers to postpone it for another year or two due to the previous procedure being hard on her and expensive.    MEDICATIONS  Current: Diclofenac, losartan.       Objective   Vital Signs:   Vitals:    12/16/24 0805 12/16/24 0836   BP: 160/88 158/88   BP Location: Left arm Left arm   Patient Position: Sitting Sitting   Pulse: 98    Temp: 97.7 °F (36.5 °C)    SpO2: 98%    Weight: 71 kg (156 lb 9.6 oz)    Height: 165.1 cm (65\")      Body mass index is 26.06 kg/m².    Wt Readings from Last 3 Encounters:   12/16/24 71 kg (156 lb 9.6 oz)   06/20/24 79.2 kg (174 lb 11.2 oz)   03/22/24 84.8 kg (187 lb)     BP Readings from Last 3 Encounters:   12/16/24 158/88   06/20/24 132/82   03/22/24 128/78       Health Maintenance   Topic Date Due    DIABETIC FOOT EXAM  Never done    DIABETIC EYE EXAM  Never done    HEPATITIS C SCREENING  Never done    ANNUAL PHYSICAL  Never done    PAP SMEAR  Never done    COLORECTAL CANCER SCREENING  08/14/2024    MAMMOGRAM  12/26/2024    HEMOGLOBIN A1C  01/08/2025    LUNG CANCER SCREENING  01/12/2025    INFLUENZA VACCINE  03/31/2025 (Originally 7/1/2024)    ZOSTER VACCINE (1 of 2) 12/14/2025 (Originally 9/22/2012)    COVID-19 Vaccine (3 - 2024-25 season) 12/15/2025 (Originally 9/1/2024)    Pneumococcal Vaccine 0-64 (1 of 2 - PCV) 12/16/2025 (Originally 9/22/1966)    BMI FOLLOWUP  12/28/2024    TDAP/TD VACCINES (2 - Td or Tdap) 12/28/2033    URINE MICROALBUMIN  Discontinued       Physical Exam  Vitals reviewed.   Constitutional:       General: She is not in acute distress.     Appearance: " Normal appearance. She is well-developed.   HENT:      Head: Normocephalic and atraumatic.      Right Ear: External ear normal.      Left Ear: External ear normal.   Eyes:      Conjunctiva/sclera: Conjunctivae normal.      Pupils: Pupils are equal, round, and reactive to light.   Cardiovascular:      Rate and Rhythm: Normal rate and regular rhythm.      Heart sounds: Normal heart sounds.   Pulmonary:      Effort: Pulmonary effort is normal.      Breath sounds: Normal breath sounds.   Musculoskeletal:      Cervical back: Neck supple.      Right lower leg: No edema.      Left lower leg: No edema.   Skin:     General: Skin is warm and dry.   Neurological:      Mental Status: She is alert and oriented to person, place, and time.   Psychiatric:         Mood and Affect: Mood and affect normal.         Behavior: Behavior normal.         Thought Content: Thought content normal.         Judgment: Judgment normal.            Result Review :  The following data was reviewed by: LONNIE Huber on 12/16/2024:  Results       Common labs          12/28/2023    08:56 4/2/2024    08:33 4/2/2024    10:02 7/8/2024    08:03   Common Labs   Glucose 107  96   106    BUN 15  19   17    Creatinine 0.97  1.09   0.99    Sodium 138  142   141    Potassium 4.5  4.4   4.8    Chloride 100  104   102    Calcium 10.1  9.4   10.0    Albumin 4.6  4.5   4.7    Total Bilirubin 0.3  0.4   0.4    Alkaline Phosphatase 101  86   85    AST (SGOT) 18  15   17    ALT (SGPT) 26  19   12    WBC 11.65    9.74    Hemoglobin 15.5    16.0    Hematocrit 45.3    47.5    Platelets 265    223    Total Cholesterol 221  137   131    Triglycerides 130  111   115    HDL Cholesterol 55  56   49    LDL Cholesterol  143  61   61    Hemoglobin A1C 6.70  6.80   6.40    Microalbumin, Urine   4.4       TSH          12/28/2023    08:56   TSH   TSH 1.200        Procedures        Assessment and Plan   Diagnoses and all orders for this visit:    1. Type 2 diabetes mellitus  without complication, without long-term current use of insulin (Primary)  -     Lipid Panel  -     TSH Rfx On Abnormal To Free T4  -     Hemoglobin A1c    2. Primary hypertension  -     losartan (COZAAR) 50 MG tablet; Take 1 tablet by mouth Daily.  Dispense: 90 tablet; Refill: 1  -     CBC & Differential  -     Comprehensive Metabolic Panel  -     Urinalysis With Culture If Indicated - Urine, Random Void    3. Left knee pain, unspecified chronicity  -     diclofenac (VOLTAREN) 50 MG EC tablet; Take 1 tablet by mouth 3 (Three) Times a Day As Needed (pain).  Dispense: 270 tablet; Refill: 1    4. Need for hepatitis C screening test  -     Hepatitis C Antibody    5. Colon cancer screening declined         1. Bedbug infestation.  The symptoms are likely attributable to chemical irritation from insecticide exposure. A couple of N95 masks will be provided for her 's use during insecticide application. She is advised to thoroughly inspect her belongings for bedbugs and to maintain personal hygiene by showering daily post-cleaning. She is also advised to wash and dry her clothes daily and store them in totes with locking handles to prevent further infestation.    2. Hypertension.  Her blood pressure was slightly elevated during this visit. Home readings have been variable, with morning systolic readings around 150 mmHg and diastolic readings between  mmHg. Evening readings are lower but still elevated. She is advised to continue monitoring her blood pressure at home and to take losartan as prescribed. A recheck of her blood pressure will be conducted today.    3. Diabetes mellitus.  She is a diet-controlled diabetic. She reports adhering to a diet plan limiting saturated fat to 15 g, cholesterol to 175 mg, and carbs to 200 g per day, occasionally exceeding these limits by no more than 10%. Blood work will be ordered to monitor her A1c levels.    4. Joint pain.  She has been taking diclofenac more frequently due  to increased physical activity from heavy housework related to the bedbug infestation. She is advised to monitor the frequency of diclofenac use and consider using Tylenol as an alternative for mild pain. She reports no stomach irritation from diclofenac.    5. Health maintenance.  She is due for a colonoscopy but prefers to postpone it for another year or two due to the previous procedure being hard on her and expensive.               FOLLOW UP  Return in about 6 months (around 6/16/2025) for Refills and fasting labs.    Patient was given instructions and counseling regarding her condition or for health maintenance advice. Please see specific information pulled into the AVS if appropriate.       LONNIE Huber  12/16/24  08:48 EST    CURRENT & DISCONTINUED MEDICATIONS  Current Outpatient Medications   Medication Instructions    aspirin 81 mg, Daily    B Complex Vitamins (VITAMIN B COMPLEX PO) 1 tablet, Daily    Calcium-Magnesium-Vitamin D 185- MG-MG-UNIT capsule 3 tablets, Daily    diclofenac (VOLTAREN) 50 mg, Oral, 3 Times Daily PRN    losartan (COZAAR) 50 mg, Oral, Daily    Misc Natural Products (OSTEO BI-FLEX ADV JOINT SHIELD PO) 1 tablet, Daily    nystatin 849982 UNIT/GM powder APPLY TOPICALLY TO THE AFFECTED AREA THREE TIMES DAILY FOR 7 DAYS    Potassium (POTASSIMIN PO) 1 tablet, Daily    rosuvastatin (CRESTOR) 10 mg, Daily       Medications Discontinued During This Encounter   Medication Reason    diclofenac (VOLTAREN) 50 MG EC tablet Reorder    losartan (COZAAR) 50 MG tablet Reorder    budesonide-formoterol (SYMBICORT) 160-4.5 MCG/ACT inhaler *Therapy completed    albuterol sulfate  (90 Base) MCG/ACT inhaler *Therapy completed        Patient or patient representative verbalized consent for the use of Ambient Listening during the visit with  LONNIE Huber for chart documentation. 12/16/2024  08:30 EST

## 2024-12-18 DIAGNOSIS — B96.20 E. COLI UTI: Primary | ICD-10-CM

## 2024-12-18 DIAGNOSIS — N39.0 E. COLI UTI: Primary | ICD-10-CM

## 2024-12-18 LAB — BACTERIA SPEC AEROBE CULT: ABNORMAL

## 2024-12-18 RX ORDER — SULFAMETHOXAZOLE AND TRIMETHOPRIM 800; 160 MG/1; MG/1
1 TABLET ORAL 2 TIMES DAILY
Qty: 10 TABLET | Refills: 0 | Status: SHIPPED | OUTPATIENT
Start: 2024-12-18

## 2025-06-11 ENCOUNTER — OFFICE VISIT (OUTPATIENT)
Dept: FAMILY MEDICINE CLINIC | Facility: CLINIC | Age: 65
End: 2025-06-11
Payer: COMMERCIAL

## 2025-06-11 VITALS
DIASTOLIC BLOOD PRESSURE: 82 MMHG | SYSTOLIC BLOOD PRESSURE: 142 MMHG | BODY MASS INDEX: 25.79 KG/M2 | OXYGEN SATURATION: 95 % | HEART RATE: 74 BPM | HEIGHT: 65 IN | TEMPERATURE: 97.8 F | WEIGHT: 154.8 LBS

## 2025-06-11 DIAGNOSIS — Z72.0 TOBACCO USE: ICD-10-CM

## 2025-06-11 DIAGNOSIS — E11.9 ENCOUNTER FOR DIABETIC FOOT EXAM: ICD-10-CM

## 2025-06-11 DIAGNOSIS — M25.562 LEFT KNEE PAIN, UNSPECIFIED CHRONICITY: ICD-10-CM

## 2025-06-11 DIAGNOSIS — Z12.31 ENCOUNTER FOR SCREENING MAMMOGRAM FOR MALIGNANT NEOPLASM OF BREAST: ICD-10-CM

## 2025-06-11 DIAGNOSIS — F17.210 NICOTINE DEPENDENCE, CIGARETTES, UNCOMPLICATED: ICD-10-CM

## 2025-06-11 DIAGNOSIS — E11.9 TYPE 2 DIABETES MELLITUS WITHOUT COMPLICATION, WITHOUT LONG-TERM CURRENT USE OF INSULIN: Primary | ICD-10-CM

## 2025-06-11 DIAGNOSIS — E11.9 DIET-CONTROLLED DIABETES MELLITUS: ICD-10-CM

## 2025-06-11 DIAGNOSIS — I10 PRIMARY HYPERTENSION: ICD-10-CM

## 2025-06-11 LAB
ALBUMIN SERPL-MCNC: 4.7 G/DL (ref 3.5–5.2)
ALBUMIN UR-MCNC: 3.3 MG/DL
ALBUMIN/GLOB SERPL: 1.5 G/DL
ALP SERPL-CCNC: 81 U/L (ref 39–117)
ALT SERPL W P-5'-P-CCNC: 14 U/L (ref 1–33)
ANION GAP SERPL CALCULATED.3IONS-SCNC: 14 MMOL/L (ref 5–15)
AST SERPL-CCNC: 23 U/L (ref 1–32)
BACTERIA UR QL AUTO: ABNORMAL /HPF
BASOPHILS # BLD AUTO: 0.05 10*3/MM3 (ref 0–0.2)
BASOPHILS NFR BLD AUTO: 0.5 % (ref 0–1.5)
BILIRUB SERPL-MCNC: 0.4 MG/DL (ref 0–1.2)
BILIRUB UR QL STRIP: NEGATIVE
BUN SERPL-MCNC: 21 MG/DL (ref 8–23)
BUN/CREAT SERPL: 19.6 (ref 7–25)
CALCIUM SPEC-SCNC: 9.4 MG/DL (ref 8.6–10.5)
CHLORIDE SERPL-SCNC: 100 MMOL/L (ref 98–107)
CHOLEST SERPL-MCNC: 164 MG/DL (ref 0–200)
CLARITY UR: ABNORMAL
CO2 SERPL-SCNC: 26 MMOL/L (ref 22–29)
COLOR UR: ABNORMAL
CREAT SERPL-MCNC: 1.07 MG/DL (ref 0.57–1)
CREAT UR-MCNC: 92.2 MG/DL
DEPRECATED RDW RBC AUTO: 46.2 FL (ref 37–54)
EGFRCR SERPLBLD CKD-EPI 2021: 58.9 ML/MIN/1.73
EOSINOPHIL # BLD AUTO: 0.17 10*3/MM3 (ref 0–0.4)
EOSINOPHIL NFR BLD AUTO: 1.7 % (ref 0.3–6.2)
ERYTHROCYTE [DISTWIDTH] IN BLOOD BY AUTOMATED COUNT: 13.2 % (ref 12.3–15.4)
GLOBULIN UR ELPH-MCNC: 3.1 GM/DL
GLUCOSE SERPL-MCNC: 90 MG/DL (ref 65–99)
GLUCOSE UR STRIP-MCNC: NEGATIVE MG/DL
HBA1C MFR BLD: 5.6 % (ref 4.8–5.6)
HCT VFR BLD AUTO: 46 % (ref 34–46.6)
HDLC SERPL-MCNC: 65 MG/DL (ref 40–60)
HGB BLD-MCNC: 15.4 G/DL (ref 12–15.9)
HGB UR QL STRIP.AUTO: NEGATIVE
HOLD SPECIMEN: NORMAL
HYALINE CASTS UR QL AUTO: ABNORMAL /LPF
IMM GRANULOCYTES # BLD AUTO: 0.02 10*3/MM3 (ref 0–0.05)
IMM GRANULOCYTES NFR BLD AUTO: 0.2 % (ref 0–0.5)
KETONES UR QL STRIP: NEGATIVE
LDLC SERPL CALC-MCNC: 80 MG/DL (ref 0–100)
LDLC/HDLC SERPL: 1.19 {RATIO}
LEUKOCYTE ESTERASE UR QL STRIP.AUTO: ABNORMAL
LYMPHOCYTES # BLD AUTO: 2.77 10*3/MM3 (ref 0.7–3.1)
LYMPHOCYTES NFR BLD AUTO: 28.1 % (ref 19.6–45.3)
MCH RBC QN AUTO: 31.8 PG (ref 26.6–33)
MCHC RBC AUTO-ENTMCNC: 33.5 G/DL (ref 31.5–35.7)
MCV RBC AUTO: 95 FL (ref 79–97)
MICROALBUMIN/CREAT UR: 35.8 MG/G (ref 0–29)
MONOCYTES # BLD AUTO: 0.75 10*3/MM3 (ref 0.1–0.9)
MONOCYTES NFR BLD AUTO: 7.6 % (ref 5–12)
NEUTROPHILS NFR BLD AUTO: 6.11 10*3/MM3 (ref 1.7–7)
NEUTROPHILS NFR BLD AUTO: 61.9 % (ref 42.7–76)
NITRITE UR QL STRIP: NEGATIVE
NRBC BLD AUTO-RTO: 0 /100 WBC (ref 0–0.2)
PH UR STRIP.AUTO: 8 [PH] (ref 5–8)
PLATELET # BLD AUTO: 231 10*3/MM3 (ref 140–450)
PMV BLD AUTO: 11.8 FL (ref 6–12)
POTASSIUM SERPL-SCNC: 4.1 MMOL/L (ref 3.5–5.2)
PROT SERPL-MCNC: 7.8 G/DL (ref 6–8.5)
PROT UR QL STRIP: ABNORMAL
RBC # BLD AUTO: 4.84 10*6/MM3 (ref 3.77–5.28)
RBC # UR STRIP: ABNORMAL /HPF
REF LAB TEST METHOD: ABNORMAL
SODIUM SERPL-SCNC: 140 MMOL/L (ref 136–145)
SP GR UR STRIP: 1.02 (ref 1–1.03)
SQUAMOUS #/AREA URNS HPF: ABNORMAL /HPF
TRIGL SERPL-MCNC: 108 MG/DL (ref 0–150)
UROBILINOGEN UR QL STRIP: ABNORMAL
VLDLC SERPL-MCNC: 19 MG/DL (ref 5–40)
WBC # UR STRIP: ABNORMAL /HPF
WBC NRBC COR # BLD AUTO: 9.87 10*3/MM3 (ref 3.4–10.8)

## 2025-06-11 PROCEDURE — 82043 UR ALBUMIN QUANTITATIVE: CPT | Performed by: NURSE PRACTITIONER

## 2025-06-11 PROCEDURE — 87086 URINE CULTURE/COLONY COUNT: CPT | Performed by: NURSE PRACTITIONER

## 2025-06-11 PROCEDURE — 83036 HEMOGLOBIN GLYCOSYLATED A1C: CPT | Performed by: NURSE PRACTITIONER

## 2025-06-11 PROCEDURE — 81001 URINALYSIS AUTO W/SCOPE: CPT | Performed by: NURSE PRACTITIONER

## 2025-06-11 PROCEDURE — 80061 LIPID PANEL: CPT | Performed by: NURSE PRACTITIONER

## 2025-06-11 PROCEDURE — 85025 COMPLETE CBC W/AUTO DIFF WBC: CPT | Performed by: NURSE PRACTITIONER

## 2025-06-11 PROCEDURE — 82570 ASSAY OF URINE CREATININE: CPT | Performed by: NURSE PRACTITIONER

## 2025-06-11 PROCEDURE — 80053 COMPREHEN METABOLIC PANEL: CPT | Performed by: NURSE PRACTITIONER

## 2025-06-11 RX ORDER — LOSARTAN POTASSIUM 100 MG/1
100 TABLET ORAL DAILY
Qty: 90 TABLET | Refills: 1 | Status: SHIPPED | OUTPATIENT
Start: 2025-06-11

## 2025-06-11 NOTE — PROGRESS NOTES
Chief Complaint  Hypertension    The PHQ has not been completed during this encounter.       History of Present Illness  Maine Taylor is a 64 y.o. female who presents to [facility] with a past medical history of  Past Medical History:   Diagnosis Date    Arthritis     Osteopenia     Sleep apnea        HPI     The patient is a 64-year-old female here to follow up on chronic conditions including hypertension, diabetes mellitus, and nicotine dependence.    She is seeking advice on whether a modification in her antihypertensive medication is necessary. She has been monitoring her blood pressure 1 hour post-antihypertensive medication intake and 1 hour prior to dinner, although the latter timing varies. Her blood pressure typically requires more than an hour to decrease post-medication. Afternoon readings are not consistently within the desired range. For instance, on 04/10/2025, her blood pressure was recorded as 161/104 at 7:52 AM, one hour after taking her antihypertensive medication, and 140/87 at 5:22 PM. She has experienced days where her blood pressure has decreased to the target range, but it generally remains above 140/90. Morning readings, taken one hour after losartan administration, are still elevated. During her lisinopril treatment, afternoon readings were consistently in the teens or 20s. She was switched from lisinopril to losartan due to a persistent dry cough. Despite increasing from 25mg to 50mg of losartan, she reports inconsistent results. She also takes low-dose aspirin daily, typically at lunchtime, and is considering whether a change in the timing of this medication might be beneficial. She has not experienced any adverse reactions to aspirin. Prior to being advised to avoid all medications except Tylenol due to her diclofenac use, she preferred regular-strength aspirin.    She is currently on diclofenac 50 mg three times daily, which she finds effective.  "However, she often does not require all three doses. By bedtime, she experiences sufficient pain to necessitate one dose to facilitate sleep. She is considering whether her pain is severe enough to warrant Tylenol or if she can manage with two doses of diclofenac before bed. She received a 270-pill refill of diclofenac, which she does not require at a daily dose. She anticipates needing a refill in December 2025.    She continues to smoke cigarettes and has no plans to quit. She has been contacted regarding an annual CT scan and is requesting an order for this.    She has been diagnosed with diabetes, which is currently managed through diet alone. She is attempting weight loss but has reached a plateau, fluctuating around 150 pounds. She ensures adequate protein intake and engages in 1 hour of cardio exercise daily, although this is sometimes reduced to 20 minutes due to arthritis flare-ups, particularly in her back. She monitors her intake of saturated fats, cholesterol, and carbohydrates, aiming to stay below 15 g of saturated fat, 175 mg of cholesterol, and 200 carbs per day. She rarely exceeds these limits by more than 10 percent.    She has declined both colonoscopy and Cologuard testing. She has undergone two colonoscopies, which she found both costly and physically taxing, resulting in several weeks of irregular bowel movements. She experienced vomiting during the preparation phase.    She is due for a mammogram.    She is due for a diabetic foot exam.    SOCIAL HISTORY  She admits to smoking.       Objective   Vital Signs:   Vitals:    06/11/25 0802   BP: 142/82   BP Location: Right arm   Patient Position: Sitting   Pulse: 74   Temp: 97.8 °F (36.6 °C)   SpO2: 95%   Weight: 70.2 kg (154 lb 12.8 oz)   Height: 165.1 cm (65\")   PainSc: 0-No pain     Body mass index is 25.76 kg/m².    Wt Readings from Last 3 Encounters:   06/11/25 70.2 kg (154 lb 12.8 oz)   12/16/24 71 kg (156 lb 9.6 oz)   06/20/24 79.2 kg (174 " lb 11.2 oz)     BP Readings from Last 3 Encounters:   06/11/25 142/82   12/16/24 158/88   06/20/24 132/82       Health Maintenance   Topic Date Due    DIABETIC EYE EXAM  Never done    URINE MICROALBUMIN-CREATININE RATIO (uACR)  Never done    PAP SMEAR  Never done    ANNUAL PHYSICAL  Never done    LUNG CANCER SCREENING  01/12/2025    HEMOGLOBIN A1C  06/16/2025    COLORECTAL CANCER SCREENING  06/11/2025 (Originally 9/22/2007)    Pneumococcal Vaccine 50+ (1 of 2 - PCV) 12/08/2025 (Originally 9/22/1981)    ZOSTER VACCINE (1 of 2) 12/14/2025 (Originally 9/22/2012)    COVID-19 Vaccine (3 - 2024-25 season) 12/15/2025 (Originally 9/1/2024)    INFLUENZA VACCINE  07/01/2025    LIPID PANEL  12/16/2025    DIABETIC FOOT EXAM  06/11/2026    MAMMOGRAM  04/07/2027    TDAP/TD VACCINES (2 - Td or Tdap) 12/28/2033    HEPATITIS C SCREENING  Completed       Physical Exam  Vitals reviewed.   Constitutional:       General: She is not in acute distress.     Appearance: Normal appearance. She is well-developed.   HENT:      Head: Normocephalic and atraumatic.      Right Ear: External ear normal.      Left Ear: External ear normal.   Eyes:      Conjunctiva/sclera: Conjunctivae normal.      Pupils: Pupils are equal, round, and reactive to light.   Cardiovascular:      Rate and Rhythm: Normal rate and regular rhythm.      Pulses:           Dorsalis pedis pulses are 2+ on the right side and 2+ on the left side.      Heart sounds: Normal heart sounds.   Pulmonary:      Effort: Pulmonary effort is normal.      Breath sounds: Normal breath sounds.   Musculoskeletal:      Cervical back: Neck supple.      Right lower leg: No edema.      Left lower leg: No edema.      Right foot: No bunion.      Left foot: No bunion.   Feet:      Right foot:      Protective Sensation: 9 sites tested.  9 sites sensed.      Skin integrity: Skin integrity normal. No ulcer, blister or callus.      Toenail Condition: Right toenails are normal.      Left foot:       Protective Sensation: 9 sites tested.  9 sites sensed.      Skin integrity: Skin integrity normal. No ulcer, blister or callus.      Toenail Condition: Left toenails are normal.      Comments:      Skin:     General: Skin is warm and dry.   Neurological:      Mental Status: She is alert and oriented to person, place, and time.   Psychiatric:         Mood and Affect: Mood and affect normal.         Behavior: Behavior normal.         Thought Content: Thought content normal.         Judgment: Judgment normal.            Result Review :  The following data was reviewed by: LONNIE Huber on 06/11/2025:  Results  Labs   - A1c: 12/2023, 6.7   - A1c: 04/2024, 6.8     Common labs          7/8/2024    08:03 12/16/2024    08:39   Common Labs   Glucose 106  95    BUN 17  16    Creatinine 0.99  0.98    Sodium 141  143    Potassium 4.8  4.6    Chloride 102  105    Calcium 10.0  9.8    Albumin 4.7  4.7    Total Bilirubin 0.4  0.4    Alkaline Phosphatase 85  84    AST (SGOT) 17  20    ALT (SGPT) 12  18    WBC 9.74  9.22    Hemoglobin 16.0  14.5    Hematocrit 47.5  45.8    Platelets 223  253    Total Cholesterol 131  147    Triglycerides 115  97    HDL Cholesterol 49  60    LDL Cholesterol  61  69    Hemoglobin A1C 6.40  5.80      TSH          12/16/2024    08:39   TSH   TSH 0.745      Lab Results   Component Value Date    RBCUA 0-2 12/16/2024    WBCUA 11-20 (A) 12/16/2024    BACTERIA 4+ (A) 12/16/2024    SQUAMEPIUA 7-12 (A) 12/16/2024    HYALCASTU 3-6 12/16/2024    METHODOLOGY Automated Microscopy 12/16/2024    COLORU Dark Yellow (A) 12/16/2024    CLARITYU Cloudy (A) 12/16/2024    PHUR 7.5 12/16/2024    SPECGRAVUR 1.024 12/16/2024    GLUCOSEU Negative 12/16/2024    KETONESU Trace (A) 12/16/2024    BILIRUBINUR Negative 12/16/2024    BLOODU Negative 12/16/2024    PROTEINUA 30 mg/dL (1+) (A) 12/16/2024    LEUKOCYTESUR Small (1+) (A) 12/16/2024    NITRITEU Positive (A) 12/16/2024    UROBILINOGEN 1.0 E.U./dL 12/16/2024      Urine Culture          12/16/2024    08:39   Urine Culture   Urine Culture >100,000 CFU/mL Escherichia coli        Procedures        Assessment and Plan   Diagnoses and all orders for this visit:    1. Type 2 diabetes mellitus without complication, without long-term current use of insulin (Primary)  -     Microalbumin / Creatinine Urine Ratio - Urine, Clean Catch  -     Urinalysis With Culture If Indicated - Urine, Clean Catch  -     CBC & Differential  -     Comprehensive Metabolic Panel  -     Hemoglobin A1c  -     Lipid Panel    2. Diet-controlled diabetes mellitus    3. Encounter for screening mammogram for malignant neoplasm of breast    4. Primary hypertension  -     losartan (COZAAR) 100 MG tablet; Take 1 tablet by mouth Daily.  Dispense: 90 tablet; Refill: 1    5. Left knee pain, unspecified chronicity    6. Nicotine dependence, cigarettes, uncomplicated  -      CT Chest Low Dose Cancer Screening WO; Future    7. Encounter for diabetic foot exam    8. Tobacco use         1. Hypertension.  - Blood pressure readings have been consistently elevated, with morning readings often >140/90.  - Increased pain and limited mobility.  - Discussed the lack of consistent results with current losartan dosage and the previous use of lisinopril.  - Losartan dosage will be increased to 100 mg. Advised to continue taking low-dose aspirin and to eat with it to avoid stomach irritation.    2. Diabetes Mellitus.  - A1c levels have been well-controlled with diet alone, with the most recent A1c being 6.8 in 04/2024.  - Diabetic foot exam performed today.  - Reviewed diet and exercise regimen, emphasizing protein intake, vegetables, and staying active.  - Continue current diet and exercise regimen.    3. Nicotine Dependence.  - Continues to smoke and is not currently considering quitting.  - Information about smoking cessation will be provided.  - Encouraged to reach out if assistance with quitting is needed.  - Discussed the  benefits of quitting smoking for lung and heart health.    4. Chronic Pain.  - Currently taking diclofenac 50 mg three times a day as needed for pain relief.  - No refill needed at this time as there is a sufficient supply until 12/2025.  - Discussed the use of Tylenol as an alternative for pain management.  - Continue current pain management regimen.    5. Health Maintenance.  - CT scan of the chest ordered to monitor for any changes due to smoking history.  - Declined both colonoscopy and Cologuard testing.  - Mammogram report being followed up.    Follow-up  The patient will follow up in 6 months.     BMI is >= 25 and <30. (Overweight) The following options were offered after discussion;: weight loss educational material (shared in after visit summary)     Maine Taylor  reports that she has been smoking cigarettes. She started smoking about 52 years ago. She has a 78.7 pack-year smoking history. She has been exposed to tobacco smoke. She has never used smokeless tobacco. I have educated her on the risk of diseases from using tobacco products such as cancer, COPD, and heart disease.     I advised her to quit and she is not willing to quit.    I spent 3  minutes counseling the patient.     Lung Cancer Screening Counseling and Shared Decision-Making Visit  Using decision aid, [employer]com, I have shared information with the patient about interventions to reduce the risk of dying from lung cancer, including quitting smoking and annual lung cancer screening.     Maine meets all the following criteria to be eligible for Lung Cancer Screening:  Age 50-80  Asymptomatic (no signs or symptoms of lung cancer)  Tobacco smoking history of at least 20 pack years  Current smoker or has quit smoking within the past 15 years    We discussed that the Katki et al. prediction model estimates a reduced risk of death by lung cancer with screening for [place].    I provided Maine with information about the potential harms of  screening, including: false positives and false negatives, follow-up diagnostic testing, over-diagnosis, and radiation exposure. I also counseled on the importance of adherence to annual lung cancer LDCT screening, impact of comorbidities, and ability or willingness to undergo diagnosis and treatment.     After our discussion, Maine has decided to undergo screening with low-dose CT scan, and the order has been placed.    Additionally, Maine was counseled on the importance of maintaining cigarette smoking abstinence if former smoker; or the importance of smoking cessation if current smoker. If appropriate, the patient was furnished with information about tobacco cessation interventions.      FOLLOW UP  Return in about 6 months (around 12/11/2025) for Refills and fasting labs.    Patient was given instructions and counseling regarding her condition or for health maintenance advice. Please see specific information pulled into the AVS if appropriate.       [place], LONNIE  06/11/25  10:51 EDT    CURRENT & DISCONTINUED MEDICATIONS  Current Outpatient Medications   Medication Instructions    aspirin 81 mg, Daily    B Complex Vitamins (VITAMIN B COMPLEX PO) 1 tablet, Daily    Calcium-Magnesium-Vitamin D 185- MG-MG-UNIT capsule 3 tablets, Daily    diclofenac (VOLTAREN) 50 mg, Oral, 3 Times Daily PRN    losartan (COZAAR) 100 mg, Oral, Daily    nystatin [id number] UNIT/GM powder APPLY TOPICALLY TO THE AFFECTED AREA THREE TIMES DAILY FOR 7 DAYS    Potassium (POTASSIMIN PO) 1 tablet, Daily    rosuvastatin (CRESTOR) 10 mg, Daily       Medications Discontinued During This Encounter   Medication Reason    Misc Natural Products (OSTEO BI-FLEX ADV JOINT SHIELD PO) *Therapy completed    sulfamethoxazole-trimethoprim (Bactrim DS) 800-160 MG per tablet *Therapy completed    losartan (COZAAR) 50 MG tablet Reorder        Patient or patient representative verbalized consent for the use of Ambient Listening during the visit  with  LONNIE Huber for chart documentation. 6/11/2025  08:34 EDT

## 2025-06-11 NOTE — PROGRESS NOTES
..  Venipuncture Blood Specimen Collection  Venipuncture performed in LT arm by Margarita Bhardwaj with good hemostasis. Patient tolerated the procedure well without complications.   06/11/25   Belem Odonnell MA

## 2025-06-13 LAB — BACTERIA SPEC AEROBE CULT: NORMAL

## 2025-06-17 ENCOUNTER — RESULTS FOLLOW-UP (OUTPATIENT)
Dept: FAMILY MEDICINE CLINIC | Facility: CLINIC | Age: 65
End: 2025-06-17
Payer: COMMERCIAL

## 2025-06-17 NOTE — LETTER
Maine Taylor  [address] [facility] [place] [address]    June 17, 2025     Dear MsBridgett Brandon:    Maine, your kidney function is abnormal; increase your water intake and we will recheck at your next follow up appointment.     Resulted Orders   Microalbumin / Creatinine Urine Ratio - Urine, Clean Catch   Result Value Ref Range    Microalbumin/Creatinine Ratio 35.8 (H) 0.0 - 29.0 mg/g    Creatinine, Urine 92.2 mg/dL    Microalbumin, Urine 3.3 mg/dL   Urinalysis With Culture If Indicated - Urine, Clean Catch   Result Value Ref Range    Color, UA Dark Yellow (A) Yellow, Straw    Appearance, UA Turbid (A) Clear    pH, UA 8.0 5.0 - 8.0    Specific Gravity, UA 1.020 1.005 - 1.030    Glucose, UA Negative Negative    Ketones, UA Negative Negative    Bilirubin, UA Negative Negative    Blood, UA Negative Negative    Protein, UA Trace (A) Negative    Leuk Esterase, UA Small (1+) (A) Negative    Nitrite, UA Negative Negative    Urobilinogen, UA 1.0 E.U./dL 0.2 - 1.0 E.U./dL   Comprehensive Metabolic Panel   Result Value Ref Range    Glucose 90 65 - 99 mg/dL    BUN 21.0 8.0 - 23.0 mg/dL    Creatinine 1.07 (H) 0.57 - 1.00 mg/dL    Sodium 140 136 - 145 mmol/L    Potassium 4.1 3.5 - 5.2 mmol/L    Chloride 100 98 - 107 mmol/L    CO2 26.0 22.0 - 29.0 mmol/L    Calcium 9.4 8.6 - 10.5 mg/dL    Total Protein 7.8 6.0 - 8.5 g/dL    Albumin 4.7 3.5 - 5.2 g/dL    ALT (SGPT) 14 1 - 33 U/L    AST (SGOT) 23 1 - 32 U/L    Alkaline Phosphatase 81 39 - 117 U/L    Total Bilirubin 0.4 0.0 - 1.2 mg/dL    Globulin 3.1 gm/dL    A/G Ratio 1.5 g/dL    BUN/Creatinine Ratio 19.6 7.0 - 25.0    Anion Gap 14.0 5.0 - 15.0 mmol/L    eGFR 58.9 (L) >60.0 mL/min/1.73   Hemoglobin A1c   Result Value Ref Range    Hemoglobin A1C 5.60 4.80 - 5.60 %   Lipid Panel   Result Value Ref Range    Total Cholesterol 164 0 - 200 mg/dL    Triglycerides 108 0 - 150 mg/dL    HDL Cholesterol 65 (H) 40 - 60 mg/dL    LDL Cholesterol  80 0 - 100 mg/dL    VLDL Cholesterol 19 5 - 40  mg/dL    LDL/HDL Ratio 1.19    CBC Auto Differential   Result Value Ref Range    WBC 9.87 3.40 - 10.80 10*3/mm3    RBC 4.84 3.77 - 5.28 10*6/mm3    Hemoglobin 15.4 12.0 - 15.9 g/dL    Hematocrit 46.0 34.0 - 46.6 %    MCV 95.0 79.0 - 97.0 fL    MCH 31.8 26.6 - 33.0 pg    MCHC 33.5 31.5 - 35.7 g/dL    RDW 13.2 12.3 - 15.4 %    RDW-SD 46.2 37.0 - 54.0 fl    MPV 11.8 6.0 - 12.0 fL    Platelets 231 140 - 450 10*3/mm3    Neutrophil % 61.9 42.7 - 76.0 %    Lymphocyte % 28.1 19.6 - 45.3 %    Monocyte % 7.6 5.0 - 12.0 %    Eosinophil % 1.7 0.3 - 6.2 %    Basophil % 0.5 0.0 - 1.5 %    Immature Grans % 0.2 0.0 - 0.5 %    Neutrophils, Absolute 6.11 1.70 - 7.00 10*3/mm3    Lymphocytes, Absolute 2.77 0.70 - 3.10 10*3/mm3    Monocytes, Absolute 0.75 0.10 - 0.90 10*3/mm3    Eosinophils, Absolute 0.17 0.00 - 0.40 10*3/mm3    Basophils, Absolute 0.05 0.00 - 0.20 10*3/mm3    Immature Grans, Absolute 0.02 0.00 - 0.05 10*3/mm3    nRBC 0.0 0.0 - 0.2 /100 WBC   [facility] Urine Culture Tube - Urine, Clean Catch   Result Value Ref Range    Extra Tube Hold for add-ons.       Comment:      Auto resulted.   Urinalysis, Microscopic Only - Urine, Clean Catch   Result Value Ref Range    RBC, UA 0-2 None Seen, 0-2 /HPF    WBC, UA 6-10 (A) None Seen, 0-2 /HPF    Bacteria, UA 4+ (A) None Seen /HPF    Squamous Epithelial Cells, UA 13-20 (A) None Seen, 0-2 /HPF    Hyaline Casts, UA 3-6 None Seen /LPF    Methodology Automated Microscopy    Urine Culture - Urine, Urine, Clean Catch   Result Value Ref Range    Urine Culture >100,000 CFU/mL Normal Urogenital Christine             Sincerely,        [place], APRN

## 2025-06-25 ENCOUNTER — HOSPITAL ENCOUNTER (OUTPATIENT)
Dept: CT IMAGING | Facility: HOSPITAL | Age: 65
Discharge: HOME | End: 2025-06-25
Admitting: NURSE PRACTITIONER
Payer: COMMERCIAL

## 2025-06-25 DIAGNOSIS — F17.210 NICOTINE DEPENDENCE, CIGARETTES, UNCOMPLICATED: ICD-10-CM

## 2025-06-25 PROCEDURE — 71271 CT THORAX LUNG CANCER SCR C-: CPT

## (undated) DEVICE — LINER SURG CANSTR SXN S/RIGD 1500CC

## (undated) DEVICE — Device: Brand: SINGLE USE INJECTOR NM600/610

## (undated) DEVICE — THE SINGLE USE ETRAP – POLYP TRAP IS USED FOR SUCTION RETRIEVAL OF ENDOSCOPICALLY REMOVED POLYPS.: Brand: ETRAP

## (undated) DEVICE — SNAR E/S POLYP SNAREMASTER OVL/10MM 2.8X2300MM YEL

## (undated) DEVICE — SOL IRRG H2O PL/BG 1000ML STRL

## (undated) DEVICE — CONN JET HYDRA H20 AUXILIARY DISP

## (undated) DEVICE — INJ SUB/MUCUS ELEVIEW FOR/GI/ENDO/PROC AMPL/10ML

## (undated) DEVICE — Device

## (undated) DEVICE — SUREFIT, DUAL DISPERSIVE ELECTRODE, CONTACT QUALITY MONITOR: Brand: SUREFIT

## (undated) DEVICE — SINGLE-USE BIOPSY FORCEPS: Brand: RADIAL JAW 4

## (undated) DEVICE — THE DISPOSABLE ROTH NET FOREIGN BODY STANDARD RETRIEVAL DEVICE IS USED IN THE ENDOSCOPIC RETRIEVAL OF FOREIGN BODY, FOOD BOLUS AND EXCISED TISSUE SUCH AS POLYPS.: Brand: ROTH NET

## (undated) DEVICE — Device: Brand: DEFENDO AIR/WATER/SUCTION AND BIOPSY VALVE

## (undated) DEVICE — SOLIDIFIER LIQLOC PLS 1500CC BT